# Patient Record
Sex: FEMALE | Race: WHITE | NOT HISPANIC OR LATINO | Employment: UNEMPLOYED | URBAN - METROPOLITAN AREA
[De-identification: names, ages, dates, MRNs, and addresses within clinical notes are randomized per-mention and may not be internally consistent; named-entity substitution may affect disease eponyms.]

---

## 2023-02-22 LAB
ALANINE AMINOTRANSFERASE (SGPT) (U/L) IN SER/PLAS: 17 U/L (ref 7–45)
ALBUMIN (G/DL) IN SER/PLAS: 4.6 G/DL (ref 3.4–5)
ALKALINE PHOSPHATASE (U/L) IN SER/PLAS: 51 U/L (ref 33–110)
ANION GAP IN SER/PLAS: 10 MMOL/L (ref 10–20)
ASPARTATE AMINOTRANSFERASE (SGOT) (U/L) IN SER/PLAS: 16 U/L (ref 9–39)
BASOPHILS (10*3/UL) IN BLOOD BY AUTOMATED COUNT: 0.03 X10E9/L (ref 0–0.1)
BASOPHILS/100 LEUKOCYTES IN BLOOD BY AUTOMATED COUNT: 0.6 % (ref 0–2)
BILIRUBIN TOTAL (MG/DL) IN SER/PLAS: 0.7 MG/DL (ref 0–1.2)
C REACTIVE PROTEIN (MG/L) IN SER/PLAS: 0.11 MG/DL
CALCIUM (MG/DL) IN SER/PLAS: 9.2 MG/DL (ref 8.6–10.3)
CARBON DIOXIDE, TOTAL (MMOL/L) IN SER/PLAS: 26 MMOL/L (ref 21–32)
CHLORIDE (MMOL/L) IN SER/PLAS: 106 MMOL/L (ref 98–107)
CHOLESTEROL (MG/DL) IN SER/PLAS: 176 MG/DL (ref 0–199)
CHOLESTEROL IN HDL (MG/DL) IN SER/PLAS: 58.1 MG/DL
CHOLESTEROL/HDL RATIO: 3
CITRULLINE ANTIBODY, IGG: <1 U/ML
CREATININE (MG/DL) IN SER/PLAS: 0.83 MG/DL (ref 0.5–1.05)
EOSINOPHILS (10*3/UL) IN BLOOD BY AUTOMATED COUNT: 0.64 X10E9/L (ref 0–0.7)
EOSINOPHILS/100 LEUKOCYTES IN BLOOD BY AUTOMATED COUNT: 13 % (ref 0–6)
ERYTHROCYTE DISTRIBUTION WIDTH (RATIO) BY AUTOMATED COUNT: 13.2 % (ref 11.5–14.5)
ERYTHROCYTE MEAN CORPUSCULAR HEMOGLOBIN CONCENTRATION (G/DL) BY AUTOMATED: 32.2 G/DL (ref 32–36)
ERYTHROCYTE MEAN CORPUSCULAR VOLUME (FL) BY AUTOMATED COUNT: 94 FL (ref 80–100)
ERYTHROCYTES (10*6/UL) IN BLOOD BY AUTOMATED COUNT: 4.7 X10E12/L (ref 4–5.2)
GFR FEMALE: >90 ML/MIN/1.73M2
GLUCOSE (MG/DL) IN SER/PLAS: 87 MG/DL (ref 74–99)
HEMATOCRIT (%) IN BLOOD BY AUTOMATED COUNT: 44.1 % (ref 36–46)
HEMOGLOBIN (G/DL) IN BLOOD: 14.2 G/DL (ref 12–16)
IMMATURE GRANULOCYTES/100 LEUKOCYTES IN BLOOD BY AUTOMATED COUNT: 0 % (ref 0–0.9)
LDL: 102 MG/DL (ref 0–99)
LEUKOCYTES (10*3/UL) IN BLOOD BY AUTOMATED COUNT: 4.9 X10E9/L (ref 4.4–11.3)
LYMPHOCYTES (10*3/UL) IN BLOOD BY AUTOMATED COUNT: 1.39 X10E9/L (ref 1.2–4.8)
LYMPHOCYTES/100 LEUKOCYTES IN BLOOD BY AUTOMATED COUNT: 28.3 % (ref 13–44)
MONOCYTES (10*3/UL) IN BLOOD BY AUTOMATED COUNT: 0.42 X10E9/L (ref 0.1–1)
MONOCYTES/100 LEUKOCYTES IN BLOOD BY AUTOMATED COUNT: 8.5 % (ref 2–10)
NEUTROPHILS (10*3/UL) IN BLOOD BY AUTOMATED COUNT: 2.44 X10E9/L (ref 1.2–7.7)
NEUTROPHILS/100 LEUKOCYTES IN BLOOD BY AUTOMATED COUNT: 49.6 % (ref 40–80)
PLATELETS (10*3/UL) IN BLOOD AUTOMATED COUNT: 205 X10E9/L (ref 150–450)
POTASSIUM (MMOL/L) IN SER/PLAS: 3.9 MMOL/L (ref 3.5–5.3)
PROTEIN TOTAL: 7.2 G/DL (ref 6.4–8.2)
SEDIMENTATION RATE, ERYTHROCYTE: 6 MM/H (ref 0–20)
SODIUM (MMOL/L) IN SER/PLAS: 138 MMOL/L (ref 136–145)
THYROTROPIN (MIU/L) IN SER/PLAS BY DETECTION LIMIT <= 0.05 MIU/L: 1.92 MIU/L (ref 0.44–3.98)
TRIGLYCERIDE (MG/DL) IN SER/PLAS: 82 MG/DL (ref 0–149)
UREA NITROGEN (MG/DL) IN SER/PLAS: 18 MG/DL (ref 6–23)
VLDL: 16 MG/DL (ref 0–40)

## 2023-02-23 LAB
ANTI-CENTROMERE: <0.2 AI
ANTI-CHROMATIN: <0.2 AI
ANTI-DNA (DS): <1 IU/ML
ANTI-JO-1 IGG: <0.2 AI
ANTI-NUCLEAR ANTIBODY (ANA): NEGATIVE
ANTI-RIBOSOMAL P: <0.2 AI
ANTI-RNP: <0.2 AI
ANTI-SCL-70: <0.2 AI
ANTI-SM/RNP: <0.2 AI
ANTI-SM: <0.2 AI
ANTI-SSA: <0.2 AI
ANTI-SSB: <0.2 AI

## 2023-05-09 DIAGNOSIS — K21.9 GASTROESOPHAGEAL REFLUX DISEASE, UNSPECIFIED WHETHER ESOPHAGITIS PRESENT: ICD-10-CM

## 2023-05-09 DIAGNOSIS — F90.9 ATTENTION DEFICIT HYPERACTIVITY DISORDER (ADHD), UNSPECIFIED ADHD TYPE: Primary | ICD-10-CM

## 2023-05-09 RX ORDER — DEXTROAMPHETAMINE SACCHARATE, AMPHETAMINE ASPARTATE, DEXTROAMPHETAMINE SULFATE AND AMPHETAMINE SULFATE 2.5; 2.5; 2.5; 2.5 MG/1; MG/1; MG/1; MG/1
10 TABLET ORAL ONCE AS NEEDED
Qty: 30 TABLET | Refills: 0 | Status: SHIPPED | OUTPATIENT
Start: 2023-05-09 | End: 2023-06-02 | Stop reason: SDUPTHER

## 2023-05-09 RX ORDER — OMEPRAZOLE 20 MG/1
20 CAPSULE, DELAYED RELEASE ORAL
Qty: 90 CAPSULE | Refills: 1 | Status: SHIPPED | OUTPATIENT
Start: 2023-05-09 | End: 2023-12-04 | Stop reason: SDUPTHER

## 2023-05-09 RX ORDER — DEXTROAMPHETAMINE SACCHARATE, AMPHETAMINE ASPARTATE MONOHYDRATE, DEXTROAMPHETAMINE SULFATE AND AMPHETAMINE SULFATE 5; 5; 5; 5 MG/1; MG/1; MG/1; MG/1
20 CAPSULE, EXTENDED RELEASE ORAL EVERY MORNING
Qty: 30 CAPSULE | Refills: 0 | Status: SHIPPED | OUTPATIENT
Start: 2023-05-09 | End: 2023-06-02 | Stop reason: SDUPTHER

## 2023-05-09 NOTE — PROGRESS NOTES
Patient requested refill on Prilosec and Adderall.  I have personally reviewed the OARRs Report. It is part of the electronic medical record. I have considered the risk, abuse, dependence, addiction and diversion. I believe that it is clinically appropriate to prescribe this medication.    Last refill: Adderall ER 10 mg #60 30 days 01/27/2023  Last office visit: 02/03/2023  Amphetamine - Dextroamphetamine XR  20 mg  in the am  Amphetamine- Dextroamphetamine 10 mg in the afternoon as needed.   UDS 12/27/2022  CSA  12/202022

## 2023-05-30 PROBLEM — C81.9A HODGKIN'S DISEASE IN REMISSION: Status: ACTIVE | Noted: 2023-05-30

## 2023-05-30 PROBLEM — H02.849 SWELLING OF EYELID: Status: ACTIVE | Noted: 2023-05-30

## 2023-05-30 PROBLEM — F90.2 ADHD (ATTENTION DEFICIT HYPERACTIVITY DISORDER), COMBINED TYPE: Status: ACTIVE | Noted: 2023-05-30

## 2023-05-30 PROBLEM — L50.3 DERMATOGRAPHIC URTICARIA: Status: ACTIVE | Noted: 2023-05-30

## 2023-05-30 PROBLEM — F17.200 SMOKING ADDICTION: Status: ACTIVE | Noted: 2023-05-30

## 2023-05-30 PROBLEM — K58.0 IRRITABLE BOWEL SYNDROME WITH DIARRHEA: Status: ACTIVE | Noted: 2023-05-30

## 2023-05-30 PROBLEM — J30.2 SEASONAL ALLERGIES: Status: ACTIVE | Noted: 2023-05-30

## 2023-05-30 PROBLEM — R25.1 TREMOR: Status: ACTIVE | Noted: 2023-05-30

## 2023-05-30 PROBLEM — C81.10: Status: ACTIVE | Noted: 2019-02-18

## 2023-05-30 PROBLEM — T45.1X5A PERIPHERAL NEUROPATHY DUE TO CHEMOTHERAPY (MULTI): Status: ACTIVE | Noted: 2023-05-30

## 2023-05-30 PROBLEM — R53.83 FATIGUE: Status: ACTIVE | Noted: 2023-05-30

## 2023-05-30 PROBLEM — B35.1 ONYCHOMYCOSIS OF RIGHT GREAT TOE: Status: ACTIVE | Noted: 2023-05-30

## 2023-05-30 PROBLEM — R19.7 DIARRHEA: Status: ACTIVE | Noted: 2023-05-30

## 2023-05-30 PROBLEM — K21.9 GERD (GASTROESOPHAGEAL REFLUX DISEASE): Status: ACTIVE | Noted: 2023-05-30

## 2023-05-30 PROBLEM — M89.8X1 PAIN OF RIGHT SCAPULA: Status: ACTIVE | Noted: 2023-05-30

## 2023-05-30 PROBLEM — R10.9 ABDOMINAL CRAMPING: Status: ACTIVE | Noted: 2023-05-30

## 2023-05-30 PROBLEM — R25.1 SHAKINESS: Status: ACTIVE | Noted: 2023-05-30

## 2023-05-30 PROBLEM — L50.3 DERMATOGRAPHISM: Status: ACTIVE | Noted: 2023-05-30

## 2023-05-30 PROBLEM — R45.0 JITTERY FEELING: Status: ACTIVE | Noted: 2023-05-30

## 2023-05-30 PROBLEM — G62.0 PERIPHERAL NEUROPATHY DUE TO CHEMOTHERAPY (MULTI): Status: ACTIVE | Noted: 2023-05-30

## 2023-05-30 PROBLEM — C81.90: Status: ACTIVE | Noted: 2023-05-30

## 2023-05-30 PROBLEM — R21 RASH: Status: ACTIVE | Noted: 2023-05-30

## 2023-05-30 PROBLEM — F17.200 NICOTINE DEPENDENCE: Status: ACTIVE | Noted: 2023-05-30

## 2023-05-30 PROBLEM — F41.9 ANXIETY: Status: ACTIVE | Noted: 2023-05-30

## 2023-05-30 PROBLEM — K22.10 ESOPHAGEAL ULCER: Status: ACTIVE | Noted: 2023-05-30

## 2023-05-30 PROBLEM — L50.8 PHYSICAL URTICARIA: Status: ACTIVE | Noted: 2023-05-30

## 2023-05-30 PROBLEM — R79.89 LOW VITAMIN D LEVEL: Status: ACTIVE | Noted: 2023-05-30

## 2023-05-30 PROBLEM — E55.9 VITAMIN D DEFICIENCY: Status: ACTIVE | Noted: 2023-05-30

## 2023-05-30 PROBLEM — R10.12 LEFT UPPER QUADRANT PAIN: Status: ACTIVE | Noted: 2023-05-30

## 2023-05-30 PROBLEM — I87.2 VENOUS INSUFFICIENCY: Status: ACTIVE | Noted: 2023-05-30

## 2023-05-30 PROBLEM — R22.1 NECK SWELLING: Status: ACTIVE | Noted: 2023-05-30

## 2023-05-30 PROBLEM — L29.9 SKIN PRURITUS: Status: ACTIVE | Noted: 2023-05-30

## 2023-05-30 PROBLEM — M54.2 NECK PAIN: Status: ACTIVE | Noted: 2023-05-30

## 2023-05-30 RX ORDER — ACETAMINOPHEN 500 MG
1 TABLET ORAL DAILY
COMMUNITY
End: 2023-06-02

## 2023-05-30 RX ORDER — BROMPHENIRAMINE MALEATE, PSEUDOEPHEDRINE HYDROCHLORIDE, AND DEXTROMETHORPHAN HYDROBROMIDE 2; 30; 10 MG/5ML; MG/5ML; MG/5ML
5 SYRUP ORAL EVERY 6 HOURS PRN
COMMUNITY
Start: 2023-03-30 | End: 2023-06-02

## 2023-05-30 RX ORDER — ESCITALOPRAM OXALATE 20 MG/1
TABLET ORAL EVERY 24 HOURS
COMMUNITY
End: 2023-06-02

## 2023-05-30 RX ORDER — HYDROXYZINE HYDROCHLORIDE 25 MG/1
TABLET, FILM COATED ORAL
COMMUNITY
Start: 2020-07-13 | End: 2023-06-02

## 2023-05-30 RX ORDER — TRIAMCINOLONE ACETONIDE 1 MG/G
CREAM TOPICAL 2 TIMES DAILY
COMMUNITY
Start: 2023-04-06 | End: 2023-06-02

## 2023-05-30 RX ORDER — MELOXICAM 7.5 MG/1
TABLET ORAL
COMMUNITY
Start: 2022-07-21 | End: 2023-06-02

## 2023-05-30 RX ORDER — MELOXICAM 15 MG/1
TABLET ORAL EVERY 24 HOURS
COMMUNITY
End: 2023-06-02

## 2023-05-30 RX ORDER — LORAZEPAM 1 MG/1
TABLET ORAL
COMMUNITY
Start: 2022-10-11 | End: 2023-06-02

## 2023-05-30 RX ORDER — LIDOCAINE HYDROCHLORIDE 20 MG/ML
15 SOLUTION OROPHARYNGEAL
COMMUNITY
Start: 2023-03-30 | End: 2023-06-02

## 2023-05-30 RX ORDER — RIFAXIMIN 550 MG/1
1 TABLET ORAL 3 TIMES DAILY
COMMUNITY
Start: 2022-12-15 | End: 2023-06-02

## 2023-05-30 RX ORDER — VENLAFAXINE HYDROCHLORIDE 37.5 MG/1
CAPSULE, EXTENDED RELEASE ORAL
COMMUNITY
Start: 2022-09-21 | End: 2023-06-02

## 2023-05-30 RX ORDER — GABAPENTIN 600 MG/1
600 TABLET ORAL 2 TIMES DAILY
COMMUNITY
Start: 2022-06-22 | End: 2023-06-02

## 2023-05-30 RX ORDER — LORATADINE 10 MG/1
1 TABLET ORAL NIGHTLY
COMMUNITY
End: 2023-06-02

## 2023-05-30 RX ORDER — GABAPENTIN 300 MG/1
300 CAPSULE ORAL 3 TIMES DAILY
COMMUNITY
Start: 2017-06-15 | End: 2023-06-02 | Stop reason: SDUPTHER

## 2023-05-30 RX ORDER — TERBINAFINE HYDROCHLORIDE 250 MG/1
TABLET ORAL
COMMUNITY
Start: 2021-03-30 | End: 2023-06-02

## 2023-05-30 RX ORDER — BUSPIRONE HYDROCHLORIDE 10 MG/1
10 TABLET ORAL 2 TIMES DAILY
COMMUNITY
Start: 2021-03-30 | End: 2023-06-02

## 2023-05-30 RX ORDER — FLUOXETINE HYDROCHLORIDE 40 MG/1
CAPSULE ORAL
COMMUNITY
Start: 2022-02-01 | End: 2023-06-02

## 2023-05-30 RX ORDER — ALBUTEROL SULFATE 90 UG/1
AEROSOL, METERED RESPIRATORY (INHALATION)
COMMUNITY
Start: 2022-07-26 | End: 2023-12-04 | Stop reason: ALTCHOICE

## 2023-05-30 RX ORDER — POLYETHYLENE GLYCOL 3350 17 G/17G
POWDER, FOR SOLUTION ORAL
COMMUNITY
Start: 2020-04-02 | End: 2023-06-02

## 2023-06-01 ENCOUNTER — APPOINTMENT (OUTPATIENT)
Dept: PRIMARY CARE | Facility: CLINIC | Age: 33
End: 2023-06-01
Payer: COMMERCIAL

## 2023-06-02 ENCOUNTER — OFFICE VISIT (OUTPATIENT)
Dept: PRIMARY CARE | Facility: CLINIC | Age: 33
End: 2023-06-02
Payer: COMMERCIAL

## 2023-06-02 VITALS
DIASTOLIC BLOOD PRESSURE: 82 MMHG | OXYGEN SATURATION: 98 % | SYSTOLIC BLOOD PRESSURE: 124 MMHG | HEIGHT: 70 IN | HEART RATE: 72 BPM | WEIGHT: 161 LBS | BODY MASS INDEX: 23.05 KG/M2

## 2023-06-02 DIAGNOSIS — L50.3 DERMATOGRAPHIC URTICARIA: ICD-10-CM

## 2023-06-02 DIAGNOSIS — F90.2 ADHD (ATTENTION DEFICIT HYPERACTIVITY DISORDER), COMBINED TYPE: Primary | ICD-10-CM

## 2023-06-02 DIAGNOSIS — J30.2 SEASONAL ALLERGIES: ICD-10-CM

## 2023-06-02 DIAGNOSIS — R29.898 HEAVY SENSATION OF LOWER EXTREMITY: ICD-10-CM

## 2023-06-02 DIAGNOSIS — T45.1X5A PERIPHERAL NEUROPATHY DUE TO CHEMOTHERAPY (MULTI): ICD-10-CM

## 2023-06-02 DIAGNOSIS — L29.9 SKIN PRURITUS: ICD-10-CM

## 2023-06-02 DIAGNOSIS — R21 RASH: ICD-10-CM

## 2023-06-02 DIAGNOSIS — I87.2 VENOUS INSUFFICIENCY: ICD-10-CM

## 2023-06-02 DIAGNOSIS — M79.89 SYMPTOM OF LEG SWELLING: ICD-10-CM

## 2023-06-02 DIAGNOSIS — F90.9 ATTENTION DEFICIT HYPERACTIVITY DISORDER (ADHD), UNSPECIFIED ADHD TYPE: ICD-10-CM

## 2023-06-02 DIAGNOSIS — G62.0 PERIPHERAL NEUROPATHY DUE TO CHEMOTHERAPY (MULTI): ICD-10-CM

## 2023-06-02 PROCEDURE — 99214 OFFICE O/P EST MOD 30 MIN: CPT | Performed by: NURSE PRACTITIONER

## 2023-06-02 RX ORDER — LEVOCETIRIZINE DIHYDROCHLORIDE 5 MG/1
TABLET, FILM COATED ORAL EVERY EVENING
COMMUNITY
End: 2023-06-02 | Stop reason: SDUPTHER

## 2023-06-02 RX ORDER — DEXTROAMPHETAMINE SACCHARATE, AMPHETAMINE ASPARTATE MONOHYDRATE, DEXTROAMPHETAMINE SULFATE AND AMPHETAMINE SULFATE 5; 5; 5; 5 MG/1; MG/1; MG/1; MG/1
20 CAPSULE, EXTENDED RELEASE ORAL EVERY MORNING
Qty: 30 CAPSULE | Refills: 0 | Status: SHIPPED | OUTPATIENT
Start: 2023-06-07 | End: 2023-08-02 | Stop reason: SDUPTHER

## 2023-06-02 RX ORDER — LEVOCETIRIZINE DIHYDROCHLORIDE 5 MG/1
5 TABLET, FILM COATED ORAL EVERY EVENING
Qty: 30 TABLET | Refills: 11 | Status: SHIPPED | OUTPATIENT
Start: 2023-06-02 | End: 2023-07-05

## 2023-06-02 RX ORDER — DEXTROAMPHETAMINE SACCHARATE, AMPHETAMINE ASPARTATE, DEXTROAMPHETAMINE SULFATE AND AMPHETAMINE SULFATE 2.5; 2.5; 2.5; 2.5 MG/1; MG/1; MG/1; MG/1
10 TABLET ORAL ONCE AS NEEDED
Qty: 30 TABLET | Refills: 0 | Status: SHIPPED | OUTPATIENT
Start: 2023-06-04 | End: 2023-08-02 | Stop reason: SDUPTHER

## 2023-06-02 RX ORDER — GABAPENTIN 300 MG/1
300 CAPSULE ORAL 3 TIMES DAILY
Qty: 90 CAPSULE | Refills: 5 | Status: SHIPPED | OUTPATIENT
Start: 2023-06-02 | End: 2023-12-04 | Stop reason: SDUPTHER

## 2023-06-02 ASSESSMENT — ENCOUNTER SYMPTOMS
NUMBNESS: 1
GASTROINTESTINAL NEGATIVE: 1
PSYCHIATRIC NEGATIVE: 1
CONSTITUTIONAL NEGATIVE: 1
ROS SKIN COMMENTS: AS NOTED IN HPI
CARDIOVASCULAR NEGATIVE: 1
RESPIRATORY NEGATIVE: 1

## 2023-06-02 ASSESSMENT — PAIN SCALES - GENERAL: PAINLEVEL: 0-NO PAIN

## 2023-06-02 NOTE — PROGRESS NOTES
"Subjective   Patient ID: Nelda Greenfield is a 33 y.o. female who presents for Follow-up (From [facility] ), Nail Problem, and Allergic Reaction (Bilateral arms x 1 year).    HPI   Patient here for follow up controlled medication. Last visit was virtual on 02/03/2023. She is accompanied by her .  Current concerns:   1) rash has returned that she has seen allergist for- although it is slightly different, she was told she had pressure-induced eczema  2) toe nail fungus has returned- although did not go away completely. Last treatment was 2 years ago with Terbinafine.   3) continues to have heavy feeling legs, swelling at times, wearing compression stockings. Wants referral to another vascular doctor for evaluation.   Chronic concerns: ADHD, Anxiety, IBS, GERD,   Peripheral neuropathy d/t chemotherapy, vitamin D deficiency, Seasonal Allergies, abdominal cramping, Pruritius  Specialist  - Gastroenterologist  - Allergist Dr Pham  - GYN- yearly  Labs 02/22/2023  SMOKER- 1/2 PPD    Review of Systems   Constitutional: Negative.    HENT:          Allergy symptoms- outside working in yard   Respiratory: Negative.     Cardiovascular: Negative.    Gastrointestinal: Negative.    Skin:         As noted in HPI   Neurological:  Positive for numbness (Peripheral neuropathy stable).   Psychiatric/Behavioral: Negative.           Objective   /82 (BP Location: Right arm, Patient Position: Sitting, BP Cuff Size: Adult)   Pulse 72   Ht 1.778 m (5' 10\")   Wt 73 kg (161 lb)   SpO2 98%   BMI 23.10 kg/m²   Stimulants:   What is the patient's goal of therapy? IMPROVE FOCUS  Is this being achieved with current treatment? YES    Activities of Daily Living:   Is your overall impression that this patient is benefiting (symptom reduction outweighs side effects) from stimulant therapy? Yes   Symptom Evaluation:  1. Physical functioning (fidgeting, physical impulse control, etc.) Better  2. Psychological functioning (daydreaming, " "staying on task, etc.) Better  3. Social Relationships Better  4. Family Relationships Better  5. Mood Better  6. Sleep Patterns Same  7. Overall Functioning Better    Follow up assessment(s) completed today:  No    What are the goal's of the patient's therapy? Improve focus   The goals of therapy are \"being met\" with the current medication regimen.    OARRS:  Shreya Quinones, APRN-CNP on 6/2/2023  9:51 AM  I have personally reviewed the OARRS report for Nelda Greenfield. I have considered the risks of abuse, dependence, addiction and diversion    Controlled Substance Agreement:  Date of the Last Agreement: 12/20/2022  Reviewed Controlled Substance Agreement including but not limited to the benefits, risks, and alternatives to treatment with a Controlled Substance medication(s).    Last Urine Drug Screen / ordered today: No  Recent Results (from the past 47726 hour(s))   OPIATE/OPIOID/BENZO PRESCRIPTION COMPLIANCE    Collection Time: 12/20/22  9:53 AM   Result Value Ref Range    DRUG SCREEN COMMENT URINE SEE BELOW     Creatine, Urine 122.2 mg/dL    Amphetamine Screen, Urine PRESUMPTIVE POSITIVE (A) NEGATIVE    Barbiturate Screen, Urine PRESUMPTIVE NEGATIVE NEGATIVE    Cannabinoid Screen, Urine PRESUMPTIVE NEGATIVE NEGATIVE    Cocaine Screen, Urine PRESUMPTIVE NEGATIVE NEGATIVE    PCP Screen, Urine PRESUMPTIVE NEGATIVE NEGATIVE    7-Aminoclonazepam <25 Cutoff <25 ng/mL    Alpha-Hydroxyalprazolam <25 Cutoff <25 ng/mL    Alpha-Hydroxymidazolam <25 Cutoff <25 ng/mL    Alprazolam <25 Cutoff <25 ng/mL    Chlordiazepoxide <25 Cutoff <25 ng/mL    Clonazepam <25 Cutoff <25 ng/mL    Diazepam <25 Cutoff <25 ng/mL    Lorazepam <25 Cutoff <25 ng/mL    Midazolam <25 Cutoff <25 ng/mL    Nordiazepam <25 Cutoff <25 ng/mL    Oxazepam <25 Cutoff <25 ng/mL    Temazepam <25 Cutoff <25 ng/mL    Zolpidem <25 Cutoff <25 ng/mL    Zolpidem Metabolite (ZCA) <25 Cutoff <25 ng/mL    6-Acetylmorphine <25 Cutoff <25 ng/mL    Codeine <50 Cutoff <50 " ng/mL    Hydrocodone <25 Cutoff <25 ng/mL    Hydromorphone <25 Cutoff <25 ng/mL    Morphine Urine <50 Cutoff <50 ng/mL    Norhydrocodone <25 Cutoff <25 ng/mL    Noroxycodone <25 Cutoff <25 ng/mL    Oxycodone <25 Cutoff <25 ng/mL    Oxymorphone <25 Cutoff <25 ng/mL    Tramadol <50 Cutoff <50 ng/mL    O-Desmethyltramadol <50 Cutoff <50 ng/mL    Fentanyl <2.5 Cutoff<2.5 ng/mL    Norfentanyl <2.5 Cutoff<2.5 ng/mL    METHADONE CONFIRMATION,URINE <25 Cutoff <25 ng/mL    EDDP <25 Cutoff <25 ng/mL   Amphetamine Confirm, Urine    Collection Time: 12/20/22  9:53 AM   Result Value Ref Range    Amphetamines,Urine 1,202 ng/mL    MDA, Urine <200 ng/mL    MDEA, Urine <200 ng/mL    MDMA, Urine <200 ng/mL    Methamphetamine Quant, Ur <200 ng/mL    Phentermine,Urine <200 ng/mL     Results are as expected.     Summary:  It is my overall clinical impression that this patient is benefiting from stimulant therapy.  It is my clinical opinion that this patient will benefit from continued treatment with this current medication regimen.  The patient will continue to be treated with stimulant medication.     I have personally reviewed the OARRs Report. It is part of the electronic medical record. I have considered the risk, abuse, dependence, addiction and diversion. I believe that it is clinically appropriate to prescribe this medication.    Last refill:   Amphetamine- Dextroamphetamine 10 mg 30 days 05/09/2023 -> 06/07/2023  Amphetamine- Dextroamphetamine XR 20 mg/ 24 hour 30 days 05/12/2023 -> 06/10/2023   UDS 12/7/22  CSA 12/20/22        Physical Exam  Vitals reviewed.   Constitutional:       Appearance: Normal appearance.   Cardiovascular:      Rate and Rhythm: Normal rate and regular rhythm.      Heart sounds: Normal heart sounds.   Pulmonary:      Breath sounds: Normal breath sounds.   Musculoskeletal:         General: Normal range of motion.      Right lower leg: No edema.      Left lower leg: No edema.   Skin:     Findings: Rash  (slight rash on lower right arm-faint) present.   Neurological:      General: No focal deficit present.      Mental Status: She is alert.   Psychiatric:         Mood and Affect: Mood normal.         Behavior: Behavior normal.         Judgment: Judgment normal.       Assessment/Plan   Diagnoses and all orders for this visit:  Health Maintenance  Labs - Labs 02/22/2023  Influenza- Unknown   Prevnar 13/20- Not indicated   Shingrix- Not indicated   Colonoscopy- 09/09/2022  Cervical Cancer screen -TYN  Mammogram- Not indicated   DEXA BONE Density - Not indicated.     Discussed nail fungus treatment previously was not effective, do not advise to use again. Referral to Podiatry recommended. - Plans to use over the counter treatments at this time.   ADHD (attention deficit hyperactivity disorder), combined type  -  Refilled  amphetamine-dextroamphetamine XR (Adderall XR) 20 mg 24 hr capsule; Take 1 capsule (20 mg) by mouth once daily in the morning. Do not crush or chew. Do not start before June 7, 2023.  -  Refilled  amphetamine-dextroamphetamine (Adderall) 10 mg tablet; Take 1 tablet (10 mg) by mouth 1 time if needed (in the afternoon .). Do not start before June 4, 2023.  Dermatographic urticaria / Rash / Skin pruritus /  Seasonal allergies-   -     Referral to Allergy; Future (Patient wants second opinion- recommended Dr. Cruz  -     levocetirizine (Xyzal) 5 mg tablet; Take 1 tablet (5 mg) by mouth once daily in the evening.  -     Referral to Vascular Medicine; Future  Heavy sensation of lower extremity   / Symptom of leg swelling  -     Referral to Vascular Medicine; Future - Recommended Dr. Laurie Srivastava- contact information provided- check insurance for network coverage!  Attention deficit hyperactivity disorder (ADHD), unspecified ADHD type  -     amphetamine-dextroamphetamine XR (Adderall XR) 20 mg 24 hr capsule; Take 1 capsule (20 mg) by mouth once daily in the morning. Do not crush or    chew. Do not start  before June 7, 2023.  -     amphetamine-dextroamphetamine (Adderall) 10 mg tablet; Take 1 tablet (10 mg) by mouth 1 time if needed (in the afternoon .). Do not start before June 4, 2023.  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     PLAN/FOLLOW UP 6 MONTHS

## 2023-08-02 DIAGNOSIS — F90.9 ATTENTION DEFICIT HYPERACTIVITY DISORDER (ADHD), UNSPECIFIED ADHD TYPE: ICD-10-CM

## 2023-08-02 DIAGNOSIS — F90.2 ADHD (ATTENTION DEFICIT HYPERACTIVITY DISORDER), COMBINED TYPE: ICD-10-CM

## 2023-08-02 RX ORDER — DEXTROAMPHETAMINE SACCHARATE, AMPHETAMINE ASPARTATE MONOHYDRATE, DEXTROAMPHETAMINE SULFATE AND AMPHETAMINE SULFATE 5; 5; 5; 5 MG/1; MG/1; MG/1; MG/1
20 CAPSULE, EXTENDED RELEASE ORAL EVERY MORNING
Qty: 30 CAPSULE | Refills: 0 | Status: SHIPPED | OUTPATIENT
Start: 2023-08-02 | End: 2023-09-14 | Stop reason: SDUPTHER

## 2023-08-02 RX ORDER — DEXTROAMPHETAMINE SACCHARATE, AMPHETAMINE ASPARTATE, DEXTROAMPHETAMINE SULFATE AND AMPHETAMINE SULFATE 2.5; 2.5; 2.5; 2.5 MG/1; MG/1; MG/1; MG/1
10 TABLET ORAL ONCE AS NEEDED
Qty: 30 TABLET | Refills: 0 | Status: SHIPPED | OUTPATIENT
Start: 2023-08-02 | End: 2023-09-14 | Stop reason: SDUPTHER

## 2023-09-14 DIAGNOSIS — F90.9 ATTENTION DEFICIT HYPERACTIVITY DISORDER (ADHD), UNSPECIFIED ADHD TYPE: ICD-10-CM

## 2023-09-14 DIAGNOSIS — J30.2 SEASONAL ALLERGIES: ICD-10-CM

## 2023-09-14 DIAGNOSIS — L29.9 SKIN PRURITUS: ICD-10-CM

## 2023-09-14 DIAGNOSIS — F90.2 ADHD (ATTENTION DEFICIT HYPERACTIVITY DISORDER), COMBINED TYPE: ICD-10-CM

## 2023-09-14 RX ORDER — LEVOCETIRIZINE DIHYDROCHLORIDE 5 MG/1
5 TABLET, FILM COATED ORAL EVERY EVENING
Qty: 30 TABLET | Refills: 11 | Status: SHIPPED | OUTPATIENT
Start: 2023-09-14 | End: 2024-06-04 | Stop reason: SDUPTHER

## 2023-09-14 RX ORDER — DEXTROAMPHETAMINE SACCHARATE, AMPHETAMINE ASPARTATE MONOHYDRATE, DEXTROAMPHETAMINE SULFATE AND AMPHETAMINE SULFATE 5; 5; 5; 5 MG/1; MG/1; MG/1; MG/1
20 CAPSULE, EXTENDED RELEASE ORAL EVERY MORNING
Qty: 30 CAPSULE | Refills: 0 | Status: SHIPPED | OUTPATIENT
Start: 2023-09-14 | End: 2023-10-17 | Stop reason: SDUPTHER

## 2023-09-14 RX ORDER — DEXTROAMPHETAMINE SACCHARATE, AMPHETAMINE ASPARTATE, DEXTROAMPHETAMINE SULFATE AND AMPHETAMINE SULFATE 2.5; 2.5; 2.5; 2.5 MG/1; MG/1; MG/1; MG/1
10 TABLET ORAL ONCE AS NEEDED
Qty: 30 TABLET | Refills: 0 | Status: SHIPPED | OUTPATIENT
Start: 2023-09-14 | End: 2023-10-17 | Stop reason: SDUPTHER

## 2023-10-17 DIAGNOSIS — F90.2 ADHD (ATTENTION DEFICIT HYPERACTIVITY DISORDER), COMBINED TYPE: ICD-10-CM

## 2023-10-17 DIAGNOSIS — F90.9 ATTENTION DEFICIT HYPERACTIVITY DISORDER (ADHD), UNSPECIFIED ADHD TYPE: ICD-10-CM

## 2023-10-17 RX ORDER — DEXTROAMPHETAMINE SACCHARATE, AMPHETAMINE ASPARTATE, DEXTROAMPHETAMINE SULFATE AND AMPHETAMINE SULFATE 2.5; 2.5; 2.5; 2.5 MG/1; MG/1; MG/1; MG/1
10 TABLET ORAL ONCE AS NEEDED
Qty: 30 TABLET | Refills: 0 | Status: SHIPPED | OUTPATIENT
Start: 2023-10-17 | End: 2023-12-04 | Stop reason: SDUPTHER

## 2023-10-17 RX ORDER — DEXTROAMPHETAMINE SACCHARATE, AMPHETAMINE ASPARTATE MONOHYDRATE, DEXTROAMPHETAMINE SULFATE AND AMPHETAMINE SULFATE 5; 5; 5; 5 MG/1; MG/1; MG/1; MG/1
20 CAPSULE, EXTENDED RELEASE ORAL EVERY MORNING
Qty: 30 CAPSULE | Refills: 0 | Status: SHIPPED | OUTPATIENT
Start: 2023-10-17 | End: 2023-12-04 | Stop reason: SDUPTHER

## 2023-12-04 ENCOUNTER — OFFICE VISIT (OUTPATIENT)
Dept: PRIMARY CARE | Facility: CLINIC | Age: 33
End: 2023-12-04
Payer: COMMERCIAL

## 2023-12-04 VITALS
WEIGHT: 163 LBS | DIASTOLIC BLOOD PRESSURE: 72 MMHG | TEMPERATURE: 97.3 F | HEIGHT: 70 IN | OXYGEN SATURATION: 97 % | BODY MASS INDEX: 23.34 KG/M2 | HEART RATE: 86 BPM | SYSTOLIC BLOOD PRESSURE: 122 MMHG

## 2023-12-04 DIAGNOSIS — F40.243 FEAR OF FLYING: Primary | ICD-10-CM

## 2023-12-04 DIAGNOSIS — Z79.899 HIGH RISK MEDICATION USE: ICD-10-CM

## 2023-12-04 DIAGNOSIS — G62.0 PERIPHERAL NEUROPATHY DUE TO CHEMOTHERAPY (MULTI): ICD-10-CM

## 2023-12-04 DIAGNOSIS — K21.9 GASTROESOPHAGEAL REFLUX DISEASE, UNSPECIFIED WHETHER ESOPHAGITIS PRESENT: ICD-10-CM

## 2023-12-04 DIAGNOSIS — F90.2 ADHD (ATTENTION DEFICIT HYPERACTIVITY DISORDER), COMBINED TYPE: Primary | ICD-10-CM

## 2023-12-04 DIAGNOSIS — Z13.29 SCREENING FOR THYROID DISORDER: ICD-10-CM

## 2023-12-04 DIAGNOSIS — B35.1 ONYCHOMYCOSIS OF RIGHT GREAT TOE: ICD-10-CM

## 2023-12-04 DIAGNOSIS — T45.1X5A PERIPHERAL NEUROPATHY DUE TO CHEMOTHERAPY (MULTI): ICD-10-CM

## 2023-12-04 DIAGNOSIS — Z13.220 SCREENING, LIPID: ICD-10-CM

## 2023-12-04 DIAGNOSIS — F90.9 ATTENTION DEFICIT HYPERACTIVITY DISORDER (ADHD), UNSPECIFIED ADHD TYPE: ICD-10-CM

## 2023-12-04 PROCEDURE — 99214 OFFICE O/P EST MOD 30 MIN: CPT | Performed by: NURSE PRACTITIONER

## 2023-12-04 RX ORDER — GABAPENTIN 300 MG/1
300 CAPSULE ORAL 3 TIMES DAILY
Qty: 90 CAPSULE | Refills: 5 | Status: SHIPPED | OUTPATIENT
Start: 2023-12-04 | End: 2024-06-04 | Stop reason: SDUPTHER

## 2023-12-04 RX ORDER — DEXTROAMPHETAMINE SACCHARATE, AMPHETAMINE ASPARTATE, DEXTROAMPHETAMINE SULFATE AND AMPHETAMINE SULFATE 2.5; 2.5; 2.5; 2.5 MG/1; MG/1; MG/1; MG/1
10 TABLET ORAL ONCE AS NEEDED
Qty: 30 TABLET | Refills: 0 | Status: SHIPPED | OUTPATIENT
Start: 2023-12-04 | End: 2024-01-17 | Stop reason: SDUPTHER

## 2023-12-04 RX ORDER — LORAZEPAM 0.5 MG/1
0.5 TABLET ORAL DAILY PRN
Qty: 2 TABLET | Refills: 0 | Status: SHIPPED | OUTPATIENT
Start: 2023-12-04

## 2023-12-04 RX ORDER — DEXTROAMPHETAMINE SACCHARATE, AMPHETAMINE ASPARTATE MONOHYDRATE, DEXTROAMPHETAMINE SULFATE AND AMPHETAMINE SULFATE 5; 5; 5; 5 MG/1; MG/1; MG/1; MG/1
20 CAPSULE, EXTENDED RELEASE ORAL EVERY MORNING
Qty: 30 CAPSULE | Refills: 0 | Status: SHIPPED | OUTPATIENT
Start: 2023-12-04 | End: 2024-01-17 | Stop reason: SDUPTHER

## 2023-12-04 RX ORDER — OMEPRAZOLE 20 MG/1
20 CAPSULE, DELAYED RELEASE ORAL
Qty: 90 CAPSULE | Refills: 1 | Status: SHIPPED | OUTPATIENT
Start: 2023-12-04 | End: 2024-06-04 | Stop reason: SDUPTHER

## 2023-12-04 ASSESSMENT — ENCOUNTER SYMPTOMS
RESPIRATORY NEGATIVE: 1
NEUROLOGICAL NEGATIVE: 1
CONSTITUTIONAL NEGATIVE: 1
CARDIOVASCULAR NEGATIVE: 1
GASTROINTESTINAL NEGATIVE: 1

## 2023-12-04 NOTE — PATIENT INSTRUCTIONS
Urine drug screen ordered, please complete this month  Biotin for thinning hair, talk to pharmacist regarding Adderall and hair loss.  Lab orders to be completed for next appointment review. Labs are fasting 10-12 hours do not eat, please drink adequate water prior to lab draw.

## 2023-12-04 NOTE — PROGRESS NOTES
"Subjective   Patient ID: Nelda Greenfield is a 33 y.o. female who presents for Follow-up.    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    Respiratory: Negative.     Cardiovascular: Negative.    Gastrointestinal: Negative.    Skin:         Intermittent rash.  Did not see dermatology    Neurological: Negative.        Objective   /72   Pulse 86   Temp 36.3 °C (97.3 °F)   Ht 1.778 m (5' 10\")   Wt 73.9 kg (163 lb)   SpO2 97%   BMI 23.39 kg/m²   Weight stable.     Stimulants:   What is the patient's goal of therapy? IMPROVE FOCUS  Is this being achieved with current treatment? YES    Activities of Daily Living:   Is your overall impression that this patient is benefiting (symptom reduction outweighs side effects) from stimulant therapy? Yes   1. Physical Functioning: Better  2. Family Relationship: Better  3. Social Relationship: Better  4. Mood: Better  5. Sleep Patterns: Same  6. Overall Function: Better    I have personally reviewed the OARRs Report. It is part of the electronic medical record. I have considered the risk, abuse, dependence, addiction and diversion. I believe that it is clinically appropriate to prescribe this medication.    Last refill: Adderall ER 20 mg 30 days 10/27/2023 -> 11/25/2023  Adderall 10 mg IR 30 days 10/27/2023 -> 11/25/2023  UDS 12/04/2023- ordered   CSA  12/04/2023   Physical Exam    Assessment/Plan   Labs - Labs 02/22/2023  Influenza- declined   Prevnar 13/20- Not indicated   Shingrix- Not indicated   Colonoscopy- 09/09/2022  Cervical Cancer " screen -TYN  Mammogram- Not indicated   DEXA BONE Density - Not indicated.   Diagnoses and all orders for this visit:  ADHD (attention deficit hyperactivity disorder), combined type  -     Amphetamine Confirm, Urine; Future  -     Opiate/Opioid/Benzo Prescription Compliance; Future  -  Refilled amphetamine-dextroamphetamine (Adderall) 10 mg tablet; Take 1 tablet (10 mg) by mouth 1 time if needed (in the afternoon .).  -  Refilled amphetamine-dextroamphetamine XR (Adderall XR) 20 mg 24 hr capsule; Take 1 capsule (20 mg) by mouth once daily in the morning. Do not crush or chew.  High risk medication use  -     Amphetamine Confirm, Urine; Future  -     Opiate/Opioid/Benzo Prescription Compliance; Future  Peripheral neuropathy due to chemotherapy (CMS/HCC)  -     CBC and Auto Differential; Future  -     Comprehensive Metabolic Panel; Future  -  Refilled  gabapentin (Neurontin) 300 mg capsule; Take 1 capsule (300 mg) by mouth 3 times a day.  Screening for thyroid disorder  -     TSH with reflex to Free T4 if abnormal; Future  Screening, lipid  -     Lipid Panel; Future  Onychomycosis of right great toe  -     Referral to Podiatry; Future  Gastroesophageal reflux disease, unspecified whether esophagitis present  - Refilled omeprazole (PriLOSEC) 20 mg DR capsule; Take 1 capsule (20 mg) by mouth once daily in the morning. Take before meals. Do not crush or chew.     PLAN: follow up 6 months for control refills  Labs complete for review at next appt  UDS ordered, instructed to complete this month.

## 2024-01-03 ENCOUNTER — LAB (OUTPATIENT)
Dept: LAB | Facility: LAB | Age: 34
End: 2024-01-03
Payer: COMMERCIAL

## 2024-01-03 DIAGNOSIS — Z79.899 HIGH RISK MEDICATION USE: ICD-10-CM

## 2024-01-03 DIAGNOSIS — F90.2 ADHD (ATTENTION DEFICIT HYPERACTIVITY DISORDER), COMBINED TYPE: ICD-10-CM

## 2024-01-03 PROCEDURE — 80368 SEDATIVE HYPNOTICS: CPT

## 2024-01-03 PROCEDURE — 80358 DRUG SCREENING METHADONE: CPT

## 2024-01-03 PROCEDURE — 80324 DRUG SCREEN AMPHETAMINES 1/2: CPT

## 2024-01-03 PROCEDURE — 80373 DRUG SCREENING TRAMADOL: CPT

## 2024-01-03 PROCEDURE — 82570 ASSAY OF URINE CREATININE: CPT

## 2024-01-03 PROCEDURE — 80307 DRUG TEST PRSMV CHEM ANLYZR: CPT

## 2024-01-03 PROCEDURE — 80346 BENZODIAZEPINES1-12: CPT

## 2024-01-03 PROCEDURE — 80361 OPIATES 1 OR MORE: CPT

## 2024-01-03 PROCEDURE — 80354 DRUG SCREENING FENTANYL: CPT

## 2024-01-03 PROCEDURE — 80365 DRUG SCREENING OXYCODONE: CPT

## 2024-01-04 LAB
AMPHETAMINES UR QL SCN: ABNORMAL
BARBITURATES UR QL SCN: ABNORMAL
BZE UR QL SCN: ABNORMAL
CANNABINOIDS UR QL SCN: ABNORMAL
CREAT UR-MCNC: 115 MG/DL (ref 20–320)
PCP UR QL SCN: ABNORMAL

## 2024-01-10 LAB
AMPHET UR-MCNC: 1274 NG/ML
MDA UR-MCNC: <200 NG/ML
MDEA UR-MCNC: <200 NG/ML
MDMA UR-MCNC: <200 NG/ML
METHAMPHET UR-MCNC: <200 NG/ML
PHENTERMINE UR CFM-MCNC: <200 NG/ML

## 2024-01-17 DIAGNOSIS — F90.9 ATTENTION DEFICIT HYPERACTIVITY DISORDER (ADHD), UNSPECIFIED ADHD TYPE: ICD-10-CM

## 2024-01-17 DIAGNOSIS — F90.2 ADHD (ATTENTION DEFICIT HYPERACTIVITY DISORDER), COMBINED TYPE: ICD-10-CM

## 2024-01-17 RX ORDER — DEXTROAMPHETAMINE SACCHARATE, AMPHETAMINE ASPARTATE, DEXTROAMPHETAMINE SULFATE AND AMPHETAMINE SULFATE 2.5; 2.5; 2.5; 2.5 MG/1; MG/1; MG/1; MG/1
10 TABLET ORAL ONCE AS NEEDED
Qty: 30 TABLET | Refills: 0 | Status: SHIPPED | OUTPATIENT
Start: 2024-01-17 | End: 2024-03-06 | Stop reason: SDUPTHER

## 2024-01-17 RX ORDER — DEXTROAMPHETAMINE SACCHARATE, AMPHETAMINE ASPARTATE MONOHYDRATE, DEXTROAMPHETAMINE SULFATE AND AMPHETAMINE SULFATE 5; 5; 5; 5 MG/1; MG/1; MG/1; MG/1
20 CAPSULE, EXTENDED RELEASE ORAL EVERY MORNING
Qty: 30 CAPSULE | Refills: 0 | Status: SHIPPED | OUTPATIENT
Start: 2024-01-17 | End: 2024-03-06 | Stop reason: SDUPTHER

## 2024-03-06 DIAGNOSIS — F90.2 ADHD (ATTENTION DEFICIT HYPERACTIVITY DISORDER), COMBINED TYPE: ICD-10-CM

## 2024-03-06 DIAGNOSIS — F90.9 ATTENTION DEFICIT HYPERACTIVITY DISORDER (ADHD), UNSPECIFIED ADHD TYPE: ICD-10-CM

## 2024-03-06 RX ORDER — DEXTROAMPHETAMINE SACCHARATE, AMPHETAMINE ASPARTATE, DEXTROAMPHETAMINE SULFATE AND AMPHETAMINE SULFATE 2.5; 2.5; 2.5; 2.5 MG/1; MG/1; MG/1; MG/1
10 TABLET ORAL ONCE AS NEEDED
Qty: 30 TABLET | Refills: 0 | Status: SHIPPED | OUTPATIENT
Start: 2024-03-06 | End: 2024-04-15 | Stop reason: SDUPTHER

## 2024-03-06 RX ORDER — DEXTROAMPHETAMINE SACCHARATE, AMPHETAMINE ASPARTATE MONOHYDRATE, DEXTROAMPHETAMINE SULFATE AND AMPHETAMINE SULFATE 5; 5; 5; 5 MG/1; MG/1; MG/1; MG/1
20 CAPSULE, EXTENDED RELEASE ORAL EVERY MORNING
Qty: 30 CAPSULE | Refills: 0 | Status: SHIPPED | OUTPATIENT
Start: 2024-03-06 | End: 2024-04-15 | Stop reason: SDUPTHER

## 2024-04-15 DIAGNOSIS — F90.9 ATTENTION DEFICIT HYPERACTIVITY DISORDER (ADHD), UNSPECIFIED ADHD TYPE: ICD-10-CM

## 2024-04-15 DIAGNOSIS — F90.2 ADHD (ATTENTION DEFICIT HYPERACTIVITY DISORDER), COMBINED TYPE: ICD-10-CM

## 2024-04-15 RX ORDER — DEXTROAMPHETAMINE SACCHARATE, AMPHETAMINE ASPARTATE MONOHYDRATE, DEXTROAMPHETAMINE SULFATE AND AMPHETAMINE SULFATE 5; 5; 5; 5 MG/1; MG/1; MG/1; MG/1
20 CAPSULE, EXTENDED RELEASE ORAL EVERY MORNING
Qty: 30 CAPSULE | Refills: 0 | Status: SHIPPED | OUTPATIENT
Start: 2024-04-15 | End: 2024-06-04 | Stop reason: SINTOL

## 2024-04-15 RX ORDER — DEXTROAMPHETAMINE SACCHARATE, AMPHETAMINE ASPARTATE, DEXTROAMPHETAMINE SULFATE AND AMPHETAMINE SULFATE 2.5; 2.5; 2.5; 2.5 MG/1; MG/1; MG/1; MG/1
10 TABLET ORAL ONCE AS NEEDED
Qty: 30 TABLET | Refills: 0 | Status: SHIPPED | OUTPATIENT
Start: 2024-04-15 | End: 2024-06-04 | Stop reason: SINTOL

## 2024-06-03 DIAGNOSIS — K21.9 GASTROESOPHAGEAL REFLUX DISEASE, UNSPECIFIED WHETHER ESOPHAGITIS PRESENT: ICD-10-CM

## 2024-06-03 RX ORDER — OMEPRAZOLE 20 MG/1
20 CAPSULE, DELAYED RELEASE ORAL
Qty: 30 CAPSULE | Refills: 5 | OUTPATIENT
Start: 2024-06-03

## 2024-06-04 ENCOUNTER — OFFICE VISIT (OUTPATIENT)
Dept: PRIMARY CARE | Facility: CLINIC | Age: 34
End: 2024-06-04
Payer: COMMERCIAL

## 2024-06-04 VITALS
HEIGHT: 70 IN | DIASTOLIC BLOOD PRESSURE: 80 MMHG | BODY MASS INDEX: 24.28 KG/M2 | OXYGEN SATURATION: 99 % | WEIGHT: 169.6 LBS | HEART RATE: 77 BPM | SYSTOLIC BLOOD PRESSURE: 120 MMHG

## 2024-06-04 DIAGNOSIS — G62.0 PERIPHERAL NEUROPATHY DUE TO CHEMOTHERAPY (MULTI): ICD-10-CM

## 2024-06-04 DIAGNOSIS — K21.9 GASTROESOPHAGEAL REFLUX DISEASE, UNSPECIFIED WHETHER ESOPHAGITIS PRESENT: ICD-10-CM

## 2024-06-04 DIAGNOSIS — J30.2 SEASONAL ALLERGIES: ICD-10-CM

## 2024-06-04 DIAGNOSIS — L29.9 SKIN PRURITUS: ICD-10-CM

## 2024-06-04 DIAGNOSIS — F90.2 ADHD (ATTENTION DEFICIT HYPERACTIVITY DISORDER), COMBINED TYPE: Primary | ICD-10-CM

## 2024-06-04 DIAGNOSIS — T45.1X5A PERIPHERAL NEUROPATHY DUE TO CHEMOTHERAPY (MULTI): ICD-10-CM

## 2024-06-04 PROCEDURE — 99214 OFFICE O/P EST MOD 30 MIN: CPT | Performed by: NURSE PRACTITIONER

## 2024-06-04 RX ORDER — GABAPENTIN 300 MG/1
300 CAPSULE ORAL 3 TIMES DAILY
Qty: 90 CAPSULE | Refills: 5 | Status: SHIPPED | OUTPATIENT
Start: 2024-06-04

## 2024-06-04 RX ORDER — LEVOCETIRIZINE DIHYDROCHLORIDE 5 MG/1
5 TABLET, FILM COATED ORAL EVERY EVENING
Qty: 30 TABLET | Refills: 11 | Status: SHIPPED | OUTPATIENT
Start: 2024-06-04

## 2024-06-04 RX ORDER — LISDEXAMFETAMINE DIMESYLATE 30 MG/1
30 CAPSULE ORAL EVERY MORNING
Qty: 30 CAPSULE | Refills: 0 | Status: SHIPPED | OUTPATIENT
Start: 2024-06-04 | End: 2024-07-04

## 2024-06-04 RX ORDER — OMEPRAZOLE 20 MG/1
20 CAPSULE, DELAYED RELEASE ORAL
Qty: 90 CAPSULE | Refills: 3 | Status: SHIPPED | OUTPATIENT
Start: 2024-06-04

## 2024-06-04 ASSESSMENT — ENCOUNTER SYMPTOMS
CHEST TIGHTNESS: 0
NERVOUS/ANXIOUS: 1
SHORTNESS OF BREATH: 0
CARDIOVASCULAR NEGATIVE: 1
CONSTITUTIONAL NEGATIVE: 1
DECREASED CONCENTRATION: 1
SLEEP DISTURBANCE: 0
HEADACHES: 0
GASTROINTESTINAL NEGATIVE: 1

## 2024-06-04 NOTE — PROGRESS NOTES
"Subjective   Patient ID: Nelda Greenfield is a 34 y.o. female who presents for Follow-up (6m fu /Lov 11/3/24/Per pt she feels like her adderall haincreasing her anxiety and she was hoping to discuss that today. ).    HPI   Patient here for follow up. Last office visit on 12/04/2023  Current concern  1) Adderall possibly increasing anxiety.   When not taking adderall, lack of focus and less anxiety, When taking Addeall her anxiety levels increase and has physical side effects- uncomfortable.  Reports no change in stress level.  Had tried Vyvanse previously but was also drinking energy drinks at the time so not sure if any concerning side effects, she stopped energy drinks  d/t ulcers.  Took 10 mg dose today and anxiety increased today.   Chronic concerns: ADHD, Anxiety, IBS, GERD,  Peripheral neuropathy d/t chemotherapy, vitamin D deficiency, Seasonal Allergies, abdominal cramping, Prur[facility]  Specialist  - Gastroenterologist  - Allergist Dr Pham  - GYN- yearly  Labs 02/22/2023  SMOKER- 1/2 PPD    Review of Systems   Constitutional: Negative.    Respiratory:  Negative for chest tightness (tight muscles) and shortness of breath (with increased anxiety).    Cardiovascular: Negative.    Gastrointestinal: Negative.    Neurological:  Negative for headaches.        Tingling nerve sensation with increased anxiety.   Psychiatric/Behavioral:  Positive for decreased concentration. Negative for sleep disturbance. The patient is nervous/anxious.        Objective   /80 (BP Location: Right arm, Patient Position: Sitting, BP Cuff Size: Adult)   Pulse 77   Ht 1.778 m (5' 10\")   Wt 76.9 kg (169 lb 9.6 oz)   SpO2 99%   BMI 24.34 kg/m²   Weight 163 lbs     Stimulants:   What is the patient's goal of therapy?   Is this being achieved with current treatment? IMPROVE FOCUS    Activities of Daily Living:   Is your overall impression that this patient is benefiting (symptom reduction outweighs side effects) from stimulant " therapy? No     1. Physical Functioning: Worse  2. Family Relationship: Worse  3. Social Relationship: Worse  4. Mood: Worse  5. Sleep Patterns: Same  6. Overall Function: Worse    I have personally reviewed the OARRs Report. It is part of the electronic medical record. I have considered the risk, abuse, dependence, addiction and diversion. I believe that it is clinically appropriate to prescribe this medication.    Last refill:  - Adderall 10 mg  30 days 04/15/2024 ->  05/14/2024  - Adderall ER 20 mg 30 days 04/15/2024-> 05/14/2024.   UDS 01/04/2024  CSA  12/04/2023        Physical Exam  Vitals reviewed.   Constitutional:       Appearance: She is normal weight.   HENT:      Right Ear: Tympanic membrane and ear canal normal.      Left Ear: Tympanic membrane and ear canal normal.      Mouth/Throat:      Mouth: Mucous membranes are moist.   Cardiovascular:      Rate and Rhythm: Normal rate and regular rhythm.      Heart sounds: Normal heart sounds.   Pulmonary:      Effort: Pulmonary effort is normal.      Breath sounds: Normal breath sounds.   Musculoskeletal:         General: Normal range of motion.   Skin:     General: Skin is warm.   Neurological:      General: No focal deficit present.      Mental Status: She is alert.   Psychiatric:         Mood and Affect: Mood normal.         Behavior: Behavior normal.         Judgment: Judgment normal.         Assessment/Plan   Labs - 02/22/2023- DID NOT COMPLETE LABS FOR THIS VISIT   Influenza- declined   Prevnar 13/20- Not indicated   Shingrix- Not indicated   Colonoscopy- 09/09/2022  Cervical Cancer screen -TYN  Mammogram- Not indicated   DEXA BONE Density - Not indicated.     Diagnoses and all orders for this visit:  ADHD (attention deficit hyperactivity disorder), combined type  Discontinue Adderall 10 mg in afternoon and Adderall XR 20 mg in the am.   -  Initiated lisdexamfetamine (Vyvanse) 30 mg capsule; Take 1 capsule (30 mg) by mouth once daily in the  morning.  Gastroesophageal reflux disease, unspecified whether esophagitis present  - Refilled omeprazole (PriLOSEC) 20 mg DR capsule; Take 1 capsule (20 mg) by mouth once daily in the morning. Take before meals. Do not crush or chew.  Peripheral neuropathy due to chemotherapy (Multi)  - Refilled  gabapentin (Neurontin) 300 mg capsule; Take 1 capsule (300 mg) by mouth 3 times a day.  Skin pruritus  / Seasonal allergies    PLAN: follow up one month  Vyvanse effectiveness?  Lab orders- review results at follow up.   - Refilled  levocetirizine (Xyzal) 5 mg tablet; Take 1 tablet (5 mg) by mouth once daily in the evening.

## 2024-07-02 ENCOUNTER — APPOINTMENT (OUTPATIENT)
Dept: PRIMARY CARE | Facility: CLINIC | Age: 34
End: 2024-07-02
Payer: COMMERCIAL

## 2024-07-09 DIAGNOSIS — F90.2 ADHD (ATTENTION DEFICIT HYPERACTIVITY DISORDER), COMBINED TYPE: ICD-10-CM

## 2024-07-09 RX ORDER — LISDEXAMFETAMINE DIMESYLATE 30 MG/1
30 CAPSULE ORAL EVERY MORNING
Qty: 30 CAPSULE | Refills: 0 | Status: SHIPPED | OUTPATIENT
Start: 2024-07-09 | End: 2024-08-08

## 2024-09-30 ENCOUNTER — APPOINTMENT (OUTPATIENT)
Dept: RADIOLOGY | Facility: HOSPITAL | Age: 34
End: 2024-09-30
Payer: COMMERCIAL

## 2024-09-30 ENCOUNTER — OFFICE VISIT (OUTPATIENT)
Dept: URGENT CARE | Age: 34
End: 2024-09-30
Payer: COMMERCIAL

## 2024-09-30 ENCOUNTER — APPOINTMENT (OUTPATIENT)
Dept: CARDIOLOGY | Facility: HOSPITAL | Age: 34
End: 2024-09-30
Payer: COMMERCIAL

## 2024-09-30 ENCOUNTER — HOSPITAL ENCOUNTER (EMERGENCY)
Facility: HOSPITAL | Age: 34
Discharge: HOME | End: 2024-09-30
Attending: EMERGENCY MEDICINE
Payer: COMMERCIAL

## 2024-09-30 VITALS
TEMPERATURE: 98.1 F | SYSTOLIC BLOOD PRESSURE: 128 MMHG | HEART RATE: 62 BPM | WEIGHT: 165 LBS | HEIGHT: 70 IN | BODY MASS INDEX: 23.62 KG/M2 | RESPIRATION RATE: 16 BRPM | DIASTOLIC BLOOD PRESSURE: 88 MMHG | OXYGEN SATURATION: 99 %

## 2024-09-30 VITALS
RESPIRATION RATE: 16 BRPM | OXYGEN SATURATION: 100 % | SYSTOLIC BLOOD PRESSURE: 144 MMHG | DIASTOLIC BLOOD PRESSURE: 81 MMHG | TEMPERATURE: 97 F | HEART RATE: 64 BPM

## 2024-09-30 DIAGNOSIS — R00.2 PALPITATIONS: ICD-10-CM

## 2024-09-30 DIAGNOSIS — R07.9 CHEST PAIN, UNSPECIFIED TYPE: Primary | ICD-10-CM

## 2024-09-30 LAB
ALBUMIN SERPL BCP-MCNC: 5.1 G/DL (ref 3.4–5)
ALP SERPL-CCNC: 65 U/L (ref 33–110)
ALT SERPL W P-5'-P-CCNC: 15 U/L (ref 7–45)
ANION GAP SERPL CALC-SCNC: 14 MMOL/L (ref 10–20)
APPEARANCE UR: CLEAR
AST SERPL W P-5'-P-CCNC: 15 U/L (ref 9–39)
BACTERIA #/AREA URNS AUTO: ABNORMAL /HPF
BASOPHILS # BLD AUTO: 0.06 X10*3/UL (ref 0–0.1)
BASOPHILS NFR BLD AUTO: 0.8 %
BILIRUB SERPL-MCNC: 0.9 MG/DL (ref 0–1.2)
BILIRUB UR STRIP.AUTO-MCNC: NEGATIVE MG/DL
BNP SERPL-MCNC: 27 PG/ML (ref 0–99)
BUN SERPL-MCNC: 15 MG/DL (ref 6–23)
CALCIUM SERPL-MCNC: 9.6 MG/DL (ref 8.6–10.3)
CARDIAC TROPONIN I PNL SERPL HS: 3 NG/L (ref 0–13)
CARDIAC TROPONIN I PNL SERPL HS: <3 NG/L (ref 0–13)
CHLORIDE SERPL-SCNC: 102 MMOL/L (ref 98–107)
CO2 SERPL-SCNC: 25 MMOL/L (ref 21–32)
COLOR UR: YELLOW
CREAT SERPL-MCNC: 0.88 MG/DL (ref 0.5–1.05)
D DIMER PPP FEU-MCNC: 360 NG/ML FEU
EGFRCR SERPLBLD CKD-EPI 2021: 87 ML/MIN/1.73M*2
EOSINOPHIL # BLD AUTO: 0.67 X10*3/UL (ref 0–0.7)
EOSINOPHIL NFR BLD AUTO: 8.8 %
ERYTHROCYTE [DISTWIDTH] IN BLOOD BY AUTOMATED COUNT: 14.1 % (ref 11.5–14.5)
GLUCOSE SERPL-MCNC: 111 MG/DL (ref 74–99)
GLUCOSE UR STRIP.AUTO-MCNC: NORMAL MG/DL
HCG UR QL IA.RAPID: NEGATIVE
HCT VFR BLD AUTO: 46.2 % (ref 36–46)
HGB BLD-MCNC: 15.4 G/DL (ref 12–16)
IMM GRANULOCYTES # BLD AUTO: 0.02 X10*3/UL (ref 0–0.7)
IMM GRANULOCYTES NFR BLD AUTO: 0.3 % (ref 0–0.9)
KETONES UR STRIP.AUTO-MCNC: ABNORMAL MG/DL
LEUKOCYTE ESTERASE UR QL STRIP.AUTO: NEGATIVE
LYMPHOCYTES # BLD AUTO: 2.34 X10*3/UL (ref 1.2–4.8)
LYMPHOCYTES NFR BLD AUTO: 30.7 %
MAGNESIUM SERPL-MCNC: 1.85 MG/DL (ref 1.6–2.4)
MCH RBC QN AUTO: 30.4 PG (ref 26–34)
MCHC RBC AUTO-ENTMCNC: 33.3 G/DL (ref 32–36)
MCV RBC AUTO: 91 FL (ref 80–100)
MONOCYTES # BLD AUTO: 0.55 X10*3/UL (ref 0.1–1)
MONOCYTES NFR BLD AUTO: 7.2 %
MUCOUS THREADS #/AREA URNS AUTO: ABNORMAL /LPF
NEUTROPHILS # BLD AUTO: 3.97 X10*3/UL (ref 1.2–7.7)
NEUTROPHILS NFR BLD AUTO: 52.2 %
NITRITE UR QL STRIP.AUTO: NEGATIVE
NRBC BLD-RTO: 0 /100 WBCS (ref 0–0)
PH UR STRIP.AUTO: 6 [PH]
PLATELET # BLD AUTO: 219 X10*3/UL (ref 150–450)
POTASSIUM SERPL-SCNC: 3.8 MMOL/L (ref 3.5–5.3)
PROT SERPL-MCNC: 8.3 G/DL (ref 6.4–8.2)
PROT UR STRIP.AUTO-MCNC: ABNORMAL MG/DL
RBC # BLD AUTO: 5.07 X10*6/UL (ref 4–5.2)
RBC # UR STRIP.AUTO: ABNORMAL /UL
RBC #/AREA URNS AUTO: ABNORMAL /HPF
SARS-COV-2 RNA RESP QL NAA+PROBE: NOT DETECTED
SODIUM SERPL-SCNC: 137 MMOL/L (ref 136–145)
SP GR UR STRIP.AUTO: 1.03
SQUAMOUS #/AREA URNS AUTO: ABNORMAL /HPF
TSH SERPL-ACNC: 1.2 MIU/L (ref 0.44–3.98)
UROBILINOGEN UR STRIP.AUTO-MCNC: NORMAL MG/DL
WBC # BLD AUTO: 7.6 X10*3/UL (ref 4.4–11.3)
WBC #/AREA URNS AUTO: ABNORMAL /HPF

## 2024-09-30 PROCEDURE — 71046 X-RAY EXAM CHEST 2 VIEWS: CPT

## 2024-09-30 PROCEDURE — 85379 FIBRIN DEGRADATION QUANT: CPT | Performed by: NURSE PRACTITIONER

## 2024-09-30 PROCEDURE — 85025 COMPLETE CBC W/AUTO DIFF WBC: CPT | Performed by: NURSE PRACTITIONER

## 2024-09-30 PROCEDURE — 83735 ASSAY OF MAGNESIUM: CPT | Performed by: NURSE PRACTITIONER

## 2024-09-30 PROCEDURE — 84484 ASSAY OF TROPONIN QUANT: CPT | Performed by: NURSE PRACTITIONER

## 2024-09-30 PROCEDURE — 36415 COLL VENOUS BLD VENIPUNCTURE: CPT | Performed by: NURSE PRACTITIONER

## 2024-09-30 PROCEDURE — 99203 OFFICE O/P NEW LOW 30 MIN: CPT | Performed by: PHYSICIAN ASSISTANT

## 2024-09-30 PROCEDURE — 80053 COMPREHEN METABOLIC PANEL: CPT | Performed by: NURSE PRACTITIONER

## 2024-09-30 PROCEDURE — 71260 CT THORAX DX C+: CPT | Performed by: RADIOLOGY

## 2024-09-30 PROCEDURE — 84443 ASSAY THYROID STIM HORMONE: CPT | Performed by: NURSE PRACTITIONER

## 2024-09-30 PROCEDURE — 2550000001 HC RX 255 CONTRASTS: Performed by: NURSE PRACTITIONER

## 2024-09-30 PROCEDURE — 83880 ASSAY OF NATRIURETIC PEPTIDE: CPT | Performed by: NURSE PRACTITIONER

## 2024-09-30 PROCEDURE — 87635 SARS-COV-2 COVID-19 AMP PRB: CPT | Performed by: NURSE PRACTITIONER

## 2024-09-30 PROCEDURE — 81025 URINE PREGNANCY TEST: CPT | Performed by: NURSE PRACTITIONER

## 2024-09-30 PROCEDURE — 81001 URINALYSIS AUTO W/SCOPE: CPT | Performed by: NURSE PRACTITIONER

## 2024-09-30 PROCEDURE — 99285 EMERGENCY DEPT VISIT HI MDM: CPT

## 2024-09-30 PROCEDURE — 74177 CT ABD & PELVIS W/CONTRAST: CPT

## 2024-09-30 PROCEDURE — 74177 CT ABD & PELVIS W/CONTRAST: CPT | Performed by: RADIOLOGY

## 2024-09-30 PROCEDURE — 81025 URINE PREGNANCY TEST: CPT | Performed by: EMERGENCY MEDICINE

## 2024-09-30 PROCEDURE — 71046 X-RAY EXAM CHEST 2 VIEWS: CPT | Performed by: RADIOLOGY

## 2024-09-30 PROCEDURE — 93005 ELECTROCARDIOGRAM TRACING: CPT

## 2024-09-30 PROCEDURE — 93000 ELECTROCARDIOGRAM COMPLETE: CPT | Performed by: PHYSICIAN ASSISTANT

## 2024-09-30 ASSESSMENT — PAIN - FUNCTIONAL ASSESSMENT: PAIN_FUNCTIONAL_ASSESSMENT: 0-10

## 2024-09-30 ASSESSMENT — ENCOUNTER SYMPTOMS
ABDOMINAL PAIN: 0
CHEST TIGHTNESS: 1
SORE THROAT: 0
WHEEZING: 0
SHORTNESS OF BREATH: 1
BLOOD IN STOOL: 0
NAUSEA: 0
PALPITATIONS: 1
RHINORRHEA: 0
CHILLS: 0
FEVER: 0
COUGH: 0
VOMITING: 0

## 2024-09-30 ASSESSMENT — PAIN SCALES - GENERAL: PAINLEVEL_OUTOF10: 5 - MODERATE PAIN

## 2024-09-30 ASSESSMENT — PAIN DESCRIPTION - ORIENTATION: ORIENTATION: MID

## 2024-09-30 ASSESSMENT — PAIN DESCRIPTION - DESCRIPTORS
DESCRIPTORS: PRESSURE
DESCRIPTORS: PRESSURE

## 2024-09-30 ASSESSMENT — COLUMBIA-SUICIDE SEVERITY RATING SCALE - C-SSRS
2. HAVE YOU ACTUALLY HAD ANY THOUGHTS OF KILLING YOURSELF?: NO
6. HAVE YOU EVER DONE ANYTHING, STARTED TO DO ANYTHING, OR PREPARED TO DO ANYTHING TO END YOUR LIFE?: NO
1. IN THE PAST MONTH, HAVE YOU WISHED YOU WERE DEAD OR WISHED YOU COULD GO TO SLEEP AND NOT WAKE UP?: NO

## 2024-09-30 ASSESSMENT — PAIN DESCRIPTION - LOCATION: LOCATION: CHEST

## 2024-09-30 NOTE — Clinical Note
Nelda Greenfield was seen and treated in our emergency department on 9/30/2024.  She may return to work on 10/01/2024.       If you have any questions or concerns, please don't hesitate to call.      Rubén Escobar, DO

## 2024-09-30 NOTE — ED PROVIDER NOTES
HPI   Chief Complaint   Patient presents with    Chest Pain       34-year-old female with past history of anxiety presents to the emergency department today for chest pain.  Patient states that she has been having midsternal chest discomfort for the past 4 days.  Sharp stabbing sensation.  Pain does slightly worsen with a deep breath.  Worse when she lies flat.  No relieving factors.  She is not on any OCPs.  No recent long distance travel or history of clotting disorders.  Denies any lower extremity swelling.  She does have some nasal congestion.  A cough that is nonproductive in nature.  No dizziness, headache or syncope.  Denies any abdominal or back pain.  Denies any urinary symptoms or fever.  Generalized body aches and cramping.                          Garvin Coma Scale Score: 15                  Patient History   Past Medical History:   Diagnosis Date    Personal history of other diseases of the respiratory system     History of allergic rhinitis    Personal history of other mental and behavioral disorders     History of anxiety    Personal history of other mental and behavioral disorders 02/16/2021    History of anxiety     Past Surgical History:   Procedure Laterality Date    OTHER SURGICAL HISTORY  09/27/2019    [place] tooth extraction    OTHER SURGICAL HISTORY  09/27/2019    Surgery    OTHER SURGICAL HISTORY  12/15/2022    Endoscopy    OTHER SURGICAL HISTORY  02/16/2021    Colonoscopy     Family History   Problem Relation Name Age of Onset    Leukemia Maternal Grandmother       Social History     Tobacco Use    Smoking status: Every Day     Current packs/day: 0.50     Types: Cigarettes    Smokeless tobacco: Never   Vaping Use    Vaping status: Never Used   Substance Use Topics    Alcohol use: Yes     Alcohol/week: 3.0 - 4.0 standard drinks of alcohol     Types: 3 - 4 Standard drinks or equivalent per week    Drug use: Never       Physical Exam   ED Triage Vitals [09/30/24 1209]   Temperature Heart Rate  Respirations BP   36.7 °C (98.1 °F) 62 16 128/88      Pulse Ox Temp Source Heart Rate Source Patient Position   99 % Temporal -- --      BP Location FiO2 (%)     -- --       Physical Exam  Vitals and nursing note reviewed.   Constitutional:       General: She is not in acute distress.     Appearance: Normal appearance. She is not toxic-appearing.   HENT:      Right Ear: Tympanic membrane normal.      Left Ear: Tympanic membrane normal.      Mouth/Throat:      Mouth: Mucous membranes are moist.      Pharynx: Oropharynx is clear.   Eyes:      Extraocular Movements: Extraocular movements intact.      Pupils: Pupils are equal, round, and reactive to light.   Cardiovascular:      Rate and Rhythm: Normal rate and regular rhythm.      Pulses: Normal pulses.      Heart sounds: Normal heart sounds.   Pulmonary:      Effort: Pulmonary effort is normal.      Breath sounds: Normal breath sounds.   Chest:      Chest wall: Tenderness (Tenderness with no overlying skin changes) present.   Abdominal:      General: Abdomen is flat. Bowel sounds are normal.      Palpations: Abdomen is soft.      Tenderness: There is no abdominal tenderness.   Musculoskeletal:         General: Normal range of motion.      Cervical back: Normal range of motion and neck supple.   Skin:     General: Skin is warm and dry.      Capillary Refill: Capillary refill takes less than 2 seconds.   Neurological:      General: No focal deficit present.      Mental Status: She is alert and oriented to person, place, and time.   Psychiatric:         Mood and Affect: Mood normal.         Behavior: Behavior normal.         Judgment: Judgment normal.         Labs Reviewed   URINALYSIS WITH REFLEX CULTURE AND MICROSCOPIC    Narrative:     The following orders were created for panel order Urinalysis with Reflex Culture and Microscopic.  Procedure                               Abnormality         Status                     ---------                                -----------         ------                     Urinalysis with Reflex C...[[id number]]                                                 Extra Urine Gray Tube[[id number]]                                                         Please view results for these tests on the individual orders.   SARS-COV-2 PCR   CBC WITH AUTO DIFFERENTIAL   COMPREHENSIVE METABOLIC PANEL   MAGNESIUM   TROPONIN SERIES- (INITIAL, 1 HR)    Narrative:     The following orders were created for panel order Troponin I Series, High Sensitivity (0, 1 HR).  Procedure                               Abnormality         Status                     ---------                               -----------         ------                     Troponin I, High Sensiti...[[id number]]                                                   Please view results for these tests on the individual orders.   B-TYPE NATRIURETIC PEPTIDE   D-DIMER, VTE EXCLUSION   URINALYSIS WITH REFLEX CULTURE AND MICROSCOPIC   EXTRA URINE GRAY TUBE   SERIAL TROPONIN-INITIAL     Pain Management Panel  More data exists         Latest Ref Rng & Units 1/3/2024 12/20/2022   Pain Management Panel   Amphetamine Screen, Urine Presumptive Negative Presumptive Positive  PRESUMPTIVE POSITIVE    Barbiturate Screen, Urine Presumptive Negative Presumptive Negative  PRESUMPTIVE NEGATIVE    Codeine IgE <50 ng/mL <50  <50    Hydromorphone Urine <25 ng/mL <25  <25    Morphine  <50 ng/mL <50  <50       Details                 XR chest 2 views    (Results Pending)       ED Course & MDM   ED Course as of 09/30/24 1658   Mon Sep 30, 2024   1333 EKG sinus rhythm at a rate of 71  QRS 79 QTc 447 there is no ST elevation or axis deviation [RB]   1417 D-dimer of 360.  Low risk Wells score.  PE excluded at this time. [RB]   1521 Discussed x-ray results with the patient at this time.  At this time she does notify me she does have a history of lymphoma.  Has been in remission for 11 years.  CT chest abdomen pelvis ordered at  this time. [RB]   1649 D-Dimer, Quantitative VTE Exclusion: 360  CT PE study considered but the patient's Wells score is low risk and has a negative D-Dimer. Does not require further workup for pulmonary embolic disease.   [WJ]   1650 The patient seen and examined with the nurse practitioner/physician assistant. I personally saw the patient and made/approved the management plan and take responsibility for the patient management.    History: 34-year-old female with history of prior lymphoma, neuropathy secondary to chemotherapy, and stomach ulcer on antacid presents to the emergency department for chest pain.  States that had gradual worsening of chest pain for the last 4 days.  Described as a constant sensation that reminds her of a prior pneumonia infection.  States that she denied any fever, chills, cough, new leg swelling.  No history of prior pulmonary embolism or DVT.  Exam: No significant leg swelling, pitting edema, clear lung sounds bilaterally  MDM: Patient presented to the emergency department for chest pain.  Cardiac workup has been initiated as this did not sound like cardiac in nature.  Patient vital signs did not suggest signs of sepsis or hypoxia.  Troponin and D-dimer negative.  Chest x-ray did show possible calcification, mass, lymph node that required further evaluation. CT ordered.  See other notes for continuation of MDM.  Patient likely will be okay for discharge.    Reuben Escobar DO  Emergency Medicine [WJ]   1654 Patient CT scan resulted stating that there was a calcified mass consistent with treated lymphoma but no other acute findings.  Patient appropriate for discharge with low heart score, no concern for pulmonary embolism.  Return precautions given.  Patient follow-up primary care physician. [WJ]      ED Course User Index  [RB] Gaye Ellington, APRN-CNP  [WJ] Rubén Escobar DO         Diagnoses as of 09/30/24 1658   Chest pain, unspecified type       Medical Decision Making  On initial  evaluation patient is well-appearing in the emergency department at this time.  EKG is nonischemic.  Imaging and labs are ordered. Troponin is negative.  Urine negative for pregnancy COVID is negative BNP within normal limits CMP, mag, dimer, CBC within normal limits.  No sign of infection in the urine.  X-ray was confirmed concern for mass therefore CT chest abdomen pelvis ordered.  Ultimately CT shows close enteric flattened calcified mass in the anterior superior mediastinum consistent with treated lymphoma but no active mediastinal or axillary adenopathy.  Abdomen pelvis is unremarkable.  I sat down discussed results in depth the patient educated her on close outpatient follow-up and strict return precautions.  She verbalized understanding agreement with plan has no further questions or concerns and patient will be discharged home in stable condition.        Procedure  Procedures      Differential Diagnoses include ACS, PE, pneumonia  This is not an exhaustive list of all the diagnosis and therapeutics are considered during the patient's evaluation for an emergency medical condition.    I discussed the differential, results and discharge plan with the patient and/or family/friend/caregiver if present.  I emphasized the importance of follow-up with the physician I referred them to in the timeframe recommended.  I explained reasons for the patient to return to the Emergency Department. Additional verbal discharge instructions were also given and discussed with the patient to supplement those generated by the EMR. We also discussed medications that were prescribed (if any) including common side effects and interactions. The patient was advised to abstain from driving, operating heavy machinery or making significant decisions while taking medications such as opiates and muscle relaxers that may impair this. All questions were addressed.  They understand return precautions and discharge instructions. The patient  and/or family/friend/caregiver expressed understanding.           Gaye Ellington, APRN-CNP  09/30/24 8862

## 2024-09-30 NOTE — PROGRESS NOTES
Subjective   Patient ID: Nelda Greenfield is a 34 y.o. female. They present today with a chief complaint of Palpitations (C/o palpitations, chest pounding with pain x 4 days).    History of Present Illness  Patient is a 34 year old female who presents today with a four day history of pounding heart and palpitations. She states today she developed left greater than right chest pain. She states this pain is radiating to her neck and causing it to feel swollen. She feels shaky, chest tightness and shortness of breath. She states she did have some chills yesterday though state she does not feel sick. She states she does have a known history of stomach ulcers and has been taking omeprazole for several years. She denies any fevers, wheezing, cough, nasal congestion, runny nose, heartburn, nausea, vomiting or anxiety. She does smoke about 1/2 a pack a day. She stages a family history of blood clots though has no personal history of DVT or PE. She does not use oral contraceptives. She has not attempted treatment prior to arrival. She denies any history of injury to her chest.       Palpitations  Associated symptoms: chest pain and shortness of breath    Associated symptoms: no cough, no nausea and no vomiting        Past Medical History  Allergies as of 09/30/2024 - Reviewed 09/30/2024   Allergen Reaction Noted    Codeine Other, Hives, Rash, and Swelling 05/20/2016       (Not in a hospital admission)       Past Medical History:   Diagnosis Date    Personal history of other diseases of the respiratory system     History of allergic rhinitis    Personal history of other mental and behavioral disorders     History of anxiety    Personal history of other mental and behavioral disorders 02/16/2021    History of anxiety       Past Surgical History:   Procedure Laterality Date    OTHER SURGICAL HISTORY  09/27/2019    [place] tooth extraction    OTHER SURGICAL HISTORY  09/27/2019    Surgery    OTHER SURGICAL HISTORY  12/15/2022     Endoscopy    OTHER SURGICAL HISTORY  02/16/2021    Colonoscopy        reports that she has been smoking cigarettes. She has never used smokeless tobacco. She reports current alcohol use of about 3.0 - 4.0 standard drinks of alcohol per week. She reports that she does not use drugs.    Review of Systems  Review of Systems   Constitutional:  Negative for chills and fever.   HENT:  Negative for congestion, rhinorrhea and sore throat.    Respiratory:  Positive for chest tightness and shortness of breath. Negative for cough and wheezing.    Cardiovascular:  Positive for chest pain and palpitations. Negative for leg swelling.   Gastrointestinal:  Negative for abdominal pain, blood in stool, nausea and vomiting.                                  Objective    Vitals:    09/30/24 1115   BP: 144/81   Pulse: 64   Resp: 16   Temp: 36.1 °C (97 °F)   SpO2: 100%     No LMP recorded.    Physical Exam  Vitals and nursing note reviewed.   Constitutional:       General: She is not in acute distress.     Appearance: Normal appearance.   HENT:      Head: Normocephalic.      Right Ear: Tympanic membrane and ear canal normal.      Left Ear: Tympanic membrane and ear canal normal.      Nose: No congestion or rhinorrhea.      Mouth/Throat:      Mouth: Mucous membranes are moist.      Pharynx: No oropharyngeal exudate or posterior oropharyngeal erythema.   Cardiovascular:      Rate and Rhythm: Normal rate and regular rhythm.      Heart sounds: Normal heart sounds.   Pulmonary:      Effort: Pulmonary effort is normal. No respiratory distress.      Breath sounds: Normal breath sounds. No wheezing, rhonchi or rales.   Chest:      Comments: TTP to sternal chest  Abdominal:      General: Abdomen is flat. Bowel sounds are normal.      Palpations: Abdomen is soft.      Tenderness: There is no abdominal tenderness. There is no guarding or rebound.   Lymphadenopathy:      Cervical: No cervical adenopathy.   Neurological:      Mental Status: She is  alert.         Procedures    Point of Care Test & Imaging Results from this visit  No results found for this visit on 09/30/24.   No results found.    Diagnostic study results (if any) were reviewed by Alina Arroyo PA-C.    Assessment/Plan   Allergies, medications, history, and pertinent labs/EKGs/Imaging reviewed by Alina Arroyo PA-C.     Medical Decision Making  MDM- History and physical exam of suggestive of possible cardiac abnormality. Case and EKG reviewed with supervising physician. Patient verbalized understanding of emergent need for evaluation. Patient is transferred to ER for further evaluation and care. Patient verbalized understanding and agrees with plan in ER transfer.      Orders and Diagnoses  There are no diagnoses linked to this encounter.    Medical Admin Record      Patient disposition: ED    Electronically signed by Alina Arroyo PA-C  11:33 AM

## 2024-10-01 LAB
ATRIAL RATE: 72 BPM
HOLD SPECIMEN: NORMAL
P AXIS: 69 DEGREES
PR INTERVAL: 132 MS
Q ONSET: 252 MS
QRS COUNT: 11 BEATS
QRS DURATION: 79 MS
QT INTERVAL: 411 MS
QTC CALCULATION(BAZETT): 447 MS
QTC FREDERICIA: 434 MS
R AXIS: 57 DEGREES
T AXIS: 41 DEGREES
T OFFSET: 457 MS
VENTRICULAR RATE: 71 BPM

## 2024-10-08 ENCOUNTER — APPOINTMENT (OUTPATIENT)
Dept: PRIMARY CARE | Facility: CLINIC | Age: 34
End: 2024-10-08
Payer: COMMERCIAL

## 2024-10-08 VITALS
SYSTOLIC BLOOD PRESSURE: 120 MMHG | OXYGEN SATURATION: 99 % | DIASTOLIC BLOOD PRESSURE: 86 MMHG | WEIGHT: 173 LBS | HEIGHT: 70 IN | BODY MASS INDEX: 24.77 KG/M2 | HEART RATE: 86 BPM

## 2024-10-08 DIAGNOSIS — R00.2 HEART PALPITATIONS: Primary | ICD-10-CM

## 2024-10-08 DIAGNOSIS — Z23 FLU VACCINE NEED: ICD-10-CM

## 2024-10-08 DIAGNOSIS — F41.9 ANXIETY: ICD-10-CM

## 2024-10-08 PROCEDURE — 3008F BODY MASS INDEX DOCD: CPT | Performed by: NURSE PRACTITIONER

## 2024-10-08 PROCEDURE — 90656 IIV3 VACC NO PRSV 0.5 ML IM: CPT | Performed by: NURSE PRACTITIONER

## 2024-10-08 PROCEDURE — 99214 OFFICE O/P EST MOD 30 MIN: CPT | Performed by: NURSE PRACTITIONER

## 2024-10-08 PROCEDURE — 90471 IMMUNIZATION ADMIN: CPT | Performed by: NURSE PRACTITIONER

## 2024-10-08 RX ORDER — PROPRANOLOL HYDROCHLORIDE 20 MG/1
20 TABLET ORAL 2 TIMES DAILY
Qty: 60 TABLET | Refills: 1 | Status: SHIPPED | OUTPATIENT
Start: 2024-10-08 | End: 2025-04-06

## 2024-10-08 ASSESSMENT — ENCOUNTER SYMPTOMS
COUGH: 0
CHEST TIGHTNESS: 1
NERVOUS/ANXIOUS: 1
GASTROINTESTINAL NEGATIVE: 1
MUSCULOSKELETAL NEGATIVE: 1
PALPITATIONS: 1
NEUROLOGICAL NEGATIVE: 1
CONSTITUTIONAL NEGATIVE: 1
WHEEZING: 0
SHORTNESS OF BREATH: 1

## 2024-10-08 NOTE — PROGRESS NOTES
"Subjective   Patient ID: Nelda Greenfield is a 34 y.o. female who presents for ER Follow-up (ER on 9/30/24 Hx of anxiety, presented with chest pain occurring x 4 days. Per pt she said she's feeling better but still has some sx. ER informed her it was viral. Chest xray and ct were completed. /Lov 6/4/24/No future ov scheduled ).    HPI   Patient here for ER follow up. Last office visit on 06/04/2024  Current concern  1) ER visit on 09/30/2024 chest pain, CT shows enteric flattened calcified mass anterior superior mediastinum, consistent with treated lymphoma, not no acitve mediastinal or axillary adenopathy.   Today Enmanuel continues to have chest palpitations- especially with exertion and feeling winded with walking up flight of stairs- more than previously.  Still feels heavy in chest. Continues to smoke 1/2 ppd- knows she should quit.   Chronic concerns: ADHD, Anxiety, IBS, GERD,  Peripheral neuropathy d/t chemotherapy, vitamin D deficiency, Seasonal Allergies, abdominal cramping, Pruritius  Specialist  - Gastroenterologist  - Allergist Dr Pham  - GYN- yearly  Labs Through ER 09/30/2024  SMOKER- 1/2 PPD  Caffeine- not much.      Review of Systems   Constitutional: Negative.    Respiratory:  Positive for chest tightness and shortness of breath. Negative for cough and wheezing.    Cardiovascular:  Positive for palpitations.   Gastrointestinal: Negative.    Musculoskeletal: Negative.    Neurological: Negative.    Psychiatric/Behavioral:  The patient is nervous/anxious.      Objective   /86 (BP Location: Left arm, Patient Position: Sitting, BP Cuff Size: Adult)   Pulse 86   Ht 1.778 m (5' 10\")   Wt 78.5 kg (173 lb)   LMP 09/23/2024   SpO2 99%   BMI 24.82 kg/m²   Weight 169.9 lbs     Physical Exam  Vitals reviewed.   Constitutional:       Appearance: She is normal weight.   Cardiovascular:      Rate and Rhythm: Normal rate and regular rhythm.      Heart sounds: Normal heart sounds.   Pulmonary:      " Effort: Pulmonary effort is normal.      Breath sounds: Normal breath sounds. No decreased breath sounds, wheezing or rhonchi.   Musculoskeletal:      Right lower leg: No edema.      Left lower leg: No edema.   Neurological:      General: No focal deficit present.      Mental Status: She is alert.   Psychiatric:         Attention and Perception: Attention normal.         Behavior: Behavior normal. Behavior is cooperative.       Assessment/Plan   Labs - 02/22/2023 routine- ER 09/30/2024 reviewed with patient- overall unremarkable.   Influenza- declined   Prevnar 13/20- Not indicated   Shingrix- Not indicated   Colonoscopy- 09/09/2022  Cervical CA screen - GYN  Mammogram not indicated  Diagnoses and all orders for this visit:  Heart palpitations / Anxiety  -     Holter or Event Cardiac Monitor; Future  -     Transthoracic Echo (TTE) Complete; Future  - initiated  propranolol (Inderal) 20 mg tablet; Take 1 tablet (20 mg) by mouth 2 times a day.  Flu vaccine need  -     Flu vaccine, trivalent, preservative free, age 6 months and greater (Fluarix/Fluzone/Flulaval)    PLAN: Follow up 4 weeks.

## 2024-10-28 ENCOUNTER — HOSPITAL ENCOUNTER (OUTPATIENT)
Dept: CARDIOLOGY | Facility: HOSPITAL | Age: 34
Discharge: HOME | End: 2024-10-28
Payer: COMMERCIAL

## 2024-10-28 DIAGNOSIS — R00.2 HEART PALPITATIONS: ICD-10-CM

## 2024-10-28 PROCEDURE — 93306 TTE W/DOPPLER COMPLETE: CPT

## 2024-10-28 PROCEDURE — 93306 TTE W/DOPPLER COMPLETE: CPT | Performed by: INTERNAL MEDICINE

## 2024-10-29 ENCOUNTER — APPOINTMENT (OUTPATIENT)
Dept: CARDIOLOGY | Facility: HOSPITAL | Age: 34
End: 2024-10-29
Payer: COMMERCIAL

## 2024-10-29 ENCOUNTER — HOSPITAL ENCOUNTER (OUTPATIENT)
Dept: CARDIOLOGY | Facility: HOSPITAL | Age: 34
Discharge: HOME | End: 2024-10-29
Payer: COMMERCIAL

## 2024-10-29 DIAGNOSIS — R00.2 HEART PALPITATIONS: ICD-10-CM

## 2024-10-29 LAB
AORTIC VALVE MEAN GRADIENT: 4.6 MMHG
AORTIC VALVE PEAK VELOCITY: 1.39 M/S
AV PEAK GRADIENT: 7.7 MMHG
EJECTION FRACTION APICAL 4 CHAMBER: 64.4
EJECTION FRACTION: 62 %
GLOBAL LONGITUDINAL STRAIN: 18.2 %
LEFT ATRIUM VOLUME AREA LENGTH INDEX BSA: 29.7 ML/M2
LEFT VENTRICLE INTERNAL DIMENSION DIASTOLE: 4.81 CM (ref 3.5–6)
LEFT VENTRICULAR OUTFLOW TRACT DIAMETER: 1.77 CM
LV EJECTION FRACTION BIPLANE: 62 %
MITRAL VALVE E/A RATIO: 1.61
MITRAL VALVE E/E' RATIO: 7.57
RIGHT VENTRICLE FREE WALL PEAK S': 14.14 CM/S
RIGHT VENTRICLE PEAK SYSTOLIC PRESSURE: 20.5 MMHG
TRICUSPID ANNULAR PLANE SYSTOLIC EXCURSION: 2.1 CM

## 2024-10-29 PROCEDURE — 93225 XTRNL ECG REC<48 HRS REC: CPT

## 2024-11-05 ENCOUNTER — APPOINTMENT (OUTPATIENT)
Dept: PRIMARY CARE | Facility: CLINIC | Age: 34
End: 2024-11-05
Payer: COMMERCIAL

## 2024-11-05 VITALS
OXYGEN SATURATION: 99 % | SYSTOLIC BLOOD PRESSURE: 110 MMHG | DIASTOLIC BLOOD PRESSURE: 74 MMHG | BODY MASS INDEX: 25 KG/M2 | HEART RATE: 78 BPM | WEIGHT: 174.6 LBS | HEIGHT: 70 IN

## 2024-11-05 DIAGNOSIS — T45.1X5A PERIPHERAL NEUROPATHY DUE TO CHEMOTHERAPY (MULTI): ICD-10-CM

## 2024-11-05 DIAGNOSIS — R00.2 HEART PALPITATIONS: ICD-10-CM

## 2024-11-05 DIAGNOSIS — F41.9 ANXIETY: ICD-10-CM

## 2024-11-05 DIAGNOSIS — F17.200 SMOKING ADDICTION: Primary | ICD-10-CM

## 2024-11-05 DIAGNOSIS — G62.0 PERIPHERAL NEUROPATHY DUE TO CHEMOTHERAPY (MULTI): ICD-10-CM

## 2024-11-05 DIAGNOSIS — R06.09 DYSPNEA ON EXERTION: ICD-10-CM

## 2024-11-05 PROCEDURE — 3008F BODY MASS INDEX DOCD: CPT | Performed by: NURSE PRACTITIONER

## 2024-11-05 PROCEDURE — 99214 OFFICE O/P EST MOD 30 MIN: CPT | Performed by: NURSE PRACTITIONER

## 2024-11-05 RX ORDER — GABAPENTIN 300 MG/1
300 CAPSULE ORAL 3 TIMES DAILY
Qty: 90 CAPSULE | Refills: 5 | Status: SHIPPED | OUTPATIENT
Start: 2024-11-05

## 2024-11-05 RX ORDER — PROPRANOLOL HYDROCHLORIDE 20 MG/1
20 TABLET ORAL 2 TIMES DAILY
Qty: 180 TABLET | Refills: 3 | Status: SHIPPED | OUTPATIENT
Start: 2024-11-05

## 2024-11-05 NOTE — PROGRESS NOTES
"Subjective   Patient ID: Nelda Greenfield is a 34 y.o. female who presents for Follow-up (4w/Lov 10/8/24/Holter event scanned in from 10/29/24).    HPI   Patient here for follow up results. Last office visit on 10/08/2024  Current concerns:  1) review of results for evaluation ongoing chest palpitations and dyspnea with exertion.  Had initiated Propranolol 20 mg bid for anxiety and palpitations- since starting this medication Nelda notes no palpitations have occurred. Continues to have SOB with exertion.   Chronic concerns: ADHD, Anxiety, IBS, GERD,  Peripheral neuropathy d/t chemotherapy, vitamin D deficiency, Seasonal Allergies, abdominal cramping, Pruritius  Specialist  - Gastroenterologist  - Allergist Dr Pham  - GYN- yearly  Labs Through ER 09/30/2024  SMOKER- 1/2 PPD- since approximately age 16 yo.   Caffeine- not much.      Review of Systems    Objective   /74 (BP Location: Right arm, Patient Position: Sitting, BP Cuff Size: Adult)   Pulse 78   Ht 1.778 m (5' 10\")   Wt 79.2 kg (174 lb 9.6 oz)   SpO2 99%   BMI 25.05 kg/m²   Weight last appt 173 lbs.     Physical Exam    Assessment/Plan   Labs - 02/22/2023 routine   Influenza- 10/08/2024   Prevnar 13/20- Not indicated   Shingrix- Not indicated   Colonoscopy- 09/09/2022  Cervical CA screen - GYN  Mammogram not indicated  Holter Event Monitor 10/29/2024-10/31/2024- not concerning arhythmia, predominant rhythm with sinus.    EKG (ER on 09/30/2024) NSR.  Diagnoses and all orders for this visit:  Smoking addiction  / Dyspnea on exertion  -     Complete Pulmonary Function Test (Spirometry/DLCO/Lung Volumes); Future  Smoking 1/2 ppd since age 16 yo, did quit while pregnant. Anxiety increases with thought of quitting smoking  Heart palpitations  / Anxiety  Improved anxiety level and associated heart palpitations  Holter monitor results negative for concerning arhythmia.    -  refilled propranolol (Inderal) 20 mg tablet; Take 1 tablet (20 mg) by " mouth 2 times a day.  Peripheral neuropathy due to chemotherapy (Multi)  -  refilled  gabapentin (Neurontin) 300 mg capsule; Take 1 capsule (300 mg) by mouth 3 times a day.    PLAN: Follow up next summer as wellness  PFT-> communicate results via My Chart, Discussed if patient wants smoking cessation patches etc - contact through MY Chart.

## 2024-11-26 ENCOUNTER — APPOINTMENT (OUTPATIENT)
Dept: RESPIRATORY THERAPY | Facility: HOSPITAL | Age: 34
End: 2024-11-26
Payer: COMMERCIAL

## 2024-12-20 ENCOUNTER — APPOINTMENT (OUTPATIENT)
Dept: RESPIRATORY THERAPY | Facility: HOSPITAL | Age: 34
End: 2024-12-20
Payer: COMMERCIAL

## 2025-01-17 ENCOUNTER — APPOINTMENT (OUTPATIENT)
Dept: RESPIRATORY THERAPY | Facility: HOSPITAL | Age: 35
End: 2025-01-17
Payer: COMMERCIAL

## 2025-02-12 ENCOUNTER — HOSPITAL ENCOUNTER (OUTPATIENT)
Dept: RESPIRATORY THERAPY | Facility: HOSPITAL | Age: 35
Discharge: HOME | End: 2025-02-12
Payer: COMMERCIAL

## 2025-02-12 DIAGNOSIS — F17.200 SMOKING ADDICTION: ICD-10-CM

## 2025-02-12 DIAGNOSIS — R06.09 DYSPNEA ON EXERTION: ICD-10-CM

## 2025-02-12 LAB
MGC ASCENT PFT - FEV1 - PRE: 3.49
MGC ASCENT PFT - FEV1 - PREDICTED: 3.61
MGC ASCENT PFT - FVC - PRE: 4.98
MGC ASCENT PFT - FVC - PREDICTED: 4.41

## 2025-02-12 PROCEDURE — 94010 BREATHING CAPACITY TEST: CPT

## 2025-03-03 DIAGNOSIS — J45.21: Primary | ICD-10-CM

## 2025-03-03 DIAGNOSIS — J44.89: Primary | ICD-10-CM

## 2025-03-03 DIAGNOSIS — R06.09 DYSPNEA ON EXERTION: ICD-10-CM

## 2025-03-03 DIAGNOSIS — F17.200 SMOKING ADDICTION: ICD-10-CM

## 2025-03-28 DIAGNOSIS — F40.01 FEAR OF TRAVEL WITH PANIC ATTACKS: ICD-10-CM

## 2025-03-28 DIAGNOSIS — F41.0 PANIC ATTACK: Primary | ICD-10-CM

## 2025-03-28 RX ORDER — LORAZEPAM 0.5 MG/1
0.5 TABLET ORAL DAILY PRN
Qty: 4 TABLET | Refills: 0 | Status: SHIPPED | OUTPATIENT
Start: 2025-03-28 | End: 2025-04-04

## 2025-04-07 ENCOUNTER — APPOINTMENT (OUTPATIENT)
Dept: PRIMARY CARE | Facility: CLINIC | Age: 35
End: 2025-04-07
Payer: COMMERCIAL

## 2025-04-07 VITALS
OXYGEN SATURATION: 98 % | DIASTOLIC BLOOD PRESSURE: 80 MMHG | HEART RATE: 78 BPM | SYSTOLIC BLOOD PRESSURE: 118 MMHG | WEIGHT: 177.4 LBS | BODY MASS INDEX: 25.4 KG/M2 | HEIGHT: 70 IN

## 2025-04-07 DIAGNOSIS — F41.9 ANXIETY: ICD-10-CM

## 2025-04-07 DIAGNOSIS — R00.2 HEART PALPITATIONS: ICD-10-CM

## 2025-04-07 DIAGNOSIS — K21.00 GASTROESOPHAGEAL REFLUX DISEASE WITH ESOPHAGITIS WITHOUT HEMORRHAGE: ICD-10-CM

## 2025-04-07 DIAGNOSIS — G62.0 PERIPHERAL NEUROPATHY DUE TO CHEMOTHERAPY (MULTI): ICD-10-CM

## 2025-04-07 DIAGNOSIS — R29.898 HEAVY SENSATION OF LOWER EXTREMITY: ICD-10-CM

## 2025-04-07 DIAGNOSIS — K22.10 ULCER OF ESOPHAGUS WITHOUT BLEEDING: ICD-10-CM

## 2025-04-07 DIAGNOSIS — T45.1X5A PERIPHERAL NEUROPATHY DUE TO CHEMOTHERAPY (MULTI): ICD-10-CM

## 2025-04-07 DIAGNOSIS — I87.2 VENOUS INSUFFICIENCY: Primary | ICD-10-CM

## 2025-04-07 PROCEDURE — 3008F BODY MASS INDEX DOCD: CPT | Performed by: NURSE PRACTITIONER

## 2025-04-07 PROCEDURE — 99214 OFFICE O/P EST MOD 30 MIN: CPT | Performed by: NURSE PRACTITIONER

## 2025-04-07 RX ORDER — PROPRANOLOL HYDROCHLORIDE 40 MG/1
40 TABLET ORAL 2 TIMES DAILY
Qty: 60 TABLET | Refills: 0 | Status: SHIPPED | OUTPATIENT
Start: 2025-04-07

## 2025-04-07 RX ORDER — PANTOPRAZOLE SODIUM 40 MG/1
40 TABLET, DELAYED RELEASE ORAL
Qty: 30 TABLET | Refills: 1 | Status: SHIPPED | OUTPATIENT
Start: 2025-04-07 | End: 2025-06-06

## 2025-04-07 RX ORDER — AMOXICILLIN 875 MG/1
1 TABLET, FILM COATED ORAL
COMMUNITY
Start: 2025-03-05

## 2025-04-07 ASSESSMENT — ENCOUNTER SYMPTOMS
CARDIOVASCULAR NEGATIVE: 1
RESPIRATORY NEGATIVE: 1
CONSTITUTIONAL NEGATIVE: 1
NERVOUS/ANXIOUS: 1
GASTROINTESTINAL NEGATIVE: 1
WEAKNESS: 0
NUMBNESS: 1

## 2025-04-07 NOTE — PROGRESS NOTES
"Subjective   Patient ID: Nelda Greenfield is a 35 y.o. female who presents for Anxiety (Pt here to discuss concerns with her anxiety. She feels like it has to do with her circulation. At times she feels like her circulation is so bad that she can't breath. She was dx in [place] with Venous Insufficiency in L leg./Lov 11/5/24/Nov 6/3/25 Yearly) and Earache (Still has pain in her right ear. She gets random sharp pain, still difficult to hear out of that ear. Has tenderness still going down her neck, behind her ear. ).    HPI   Patient here for ongoing anxiety. Last office visit 11/05/2024  Current concern  1) Anxiety, has been very concerned about her venous insufficiency in left leg and believes this may be increasing her anxiety.  Panic attacks continue, - had failed on Zoloft, Lexapro, Citalopram, wellbutrin, Prozac   Chronic concerns: ADHD, Anxiety, IBS, GERD,  Peripheral neuropathy d/t chemotherapy, vitamin D deficiency, Seasonal Allergies, abdominal cramping, Pruritius  Specialist  - Gastroenterologist  - Allergist Dr Pham  - GYN- yearly  Labs Through ER 09/30/2024  SMOKER- 1/2 PPD- since approximately age 16 yo.   Caffeine- not much.      Review of Systems   Constitutional: Negative.    HENT:          Double ear infection - Urgent Care one month ago, right ear continues  Antibiotic and then Prednisone.    Respiratory: Negative.     Cardiovascular: Negative.    Gastrointestinal: Negative.    Neurological:  Positive for numbness (bilateal feet and hands). Negative for weakness.   Psychiatric/Behavioral:  The patient is nervous/anxious (more panic attacks.).      Objective   /80 (BP Location: Right arm, Patient Position: Sitting, BP Cuff Size: Adult)   Pulse 78   Ht 1.778 m (5' 10\")   Wt 80.5 kg (177 lb 6.4 oz)   SpO2 98%   BMI 25.45 kg/m²   Weight in November 174.9 lbs   Physical Exam  Vitals reviewed.   HENT:      Right Ear: Tympanic membrane and ear canal normal.      Left Ear: Tympanic membrane " and ear canal normal.      Nose: Nose normal.      Mouth/Throat:      Lips: Pink.   Eyes:      General: Lids are normal.      Conjunctiva/sclera: Conjunctivae normal.   Neck:      Thyroid: No thyromegaly.        Comments: Area if tight muscle  Cardiovascular:      Rate and Rhythm: Normal rate and regular rhythm.      Heart sounds: Normal heart sounds.   Pulmonary:      Effort: Pulmonary effort is normal.      Breath sounds: Normal breath sounds.   Musculoskeletal:      Cervical back: Neck supple.   Lymphadenopathy:      Head:      Right side of head: No submental, tonsillar, preauricular, posterior auricular or occipital adenopathy.      Left side of head: No tonsillar, preauricular, posterior auricular or occipital adenopathy.      Cervical: Cervical adenopathy present.   Neurological:      Mental Status: She is alert.   Psychiatric:         Attention and Perception: Attention normal.         Mood and Affect: Mood is anxious.         Behavior: Behavior normal. Behavior is cooperative.       Assessment/Plan   Labs - 02/22/2023 routine   Influenza- 10/08/2024   Prevnar 13/20- Not indicated   Shingrix- Not indicated   Colonoscopy- 09/09/2022  Cervical CA screen - GYN  Mammogram not indicated  Holter Event Monitor 10/29/2024-10/31/2024- not concerning arhythmia, predominant rhythm with sinus.    EKG (ER on 09/30/2024) NSR.    Diagnoses and all orders for this visit:  Venous insufficiency  -     Referral to Vascular Surgery; Future  Dr  CHO   Peripheral neuropathy due to chemotherapy (Multi)  -     Referral to Neurology; Future  Ko,   Heart palpitations  -     propranolol (Inderal) 40 mg tablet; Take 1 tablet (40 mg) by mouth 2 times a day.  Anxiety  -     Referral to Psychiatry; Future  Dr. COSTA,   - Increased dose of   propranolol (Inderal) 20 mg-> 40 mg tablet; Take 1 tablet (40 mg) by mouth 2 times a day.  Gastroesophageal reflux disease with esophagitis without hemorrhage  -     Referral to  Gastroenterology; Future   SAM FREGOSO   Ulcer of esophagus without bleeding  - initiated  pantoprazole (ProtoNix) 40 mg EC tablet; Take 1 tablet (40 mg) by mouth once daily in the morning. Take before meals. Do not crush, chew, or split.  Discontinue Omeprazole   Heavy sensation of lower extremity  -     Referral to Vascular Surgery; Future  PLAN: follow up June as scheduled

## 2025-04-07 NOTE — PATIENT INSTRUCTIONS
Propanol increased to 40 mg twice daily    Start on Pantoprazole 40 mg once daily.   Start Flonase daily & Benadryl (at night).     Vascular Specialist Dr Leonel Dickinson  Neurology specialist  Dr Meneses   Psychiatry  Dr Dieudonne TYLER referral

## 2025-04-21 ENCOUNTER — OFFICE VISIT (OUTPATIENT)
Dept: PULMONOLOGY | Facility: CLINIC | Age: 35
End: 2025-04-21
Payer: COMMERCIAL

## 2025-04-21 VITALS
BODY MASS INDEX: 25.34 KG/M2 | TEMPERATURE: 98.1 F | WEIGHT: 177 LBS | SYSTOLIC BLOOD PRESSURE: 103 MMHG | HEIGHT: 70 IN | OXYGEN SATURATION: 99 % | DIASTOLIC BLOOD PRESSURE: 71 MMHG | RESPIRATION RATE: 16 BRPM | HEART RATE: 65 BPM

## 2025-04-21 DIAGNOSIS — J30.1 SEASONAL ALLERGIC RHINITIS DUE TO POLLEN: Primary | ICD-10-CM

## 2025-04-21 DIAGNOSIS — J45.30: ICD-10-CM

## 2025-04-21 DIAGNOSIS — F17.200 TOBACCO USE DISORDER: ICD-10-CM

## 2025-04-21 DIAGNOSIS — R06.09 DYSPNEA ON EXERTION: ICD-10-CM

## 2025-04-21 DIAGNOSIS — J44.89: ICD-10-CM

## 2025-04-21 PROBLEM — R45.0 JITTERY FEELING: Status: RESOLVED | Noted: 2023-05-30 | Resolved: 2025-04-21

## 2025-04-21 PROBLEM — L50.8 PHYSICAL URTICARIA: Status: RESOLVED | Noted: 2023-05-30 | Resolved: 2025-04-21

## 2025-04-21 PROCEDURE — 99204 OFFICE O/P NEW MOD 45 MIN: CPT | Performed by: STUDENT IN AN ORGANIZED HEALTH CARE EDUCATION/TRAINING PROGRAM

## 2025-04-21 PROCEDURE — 99214 OFFICE O/P EST MOD 30 MIN: CPT | Performed by: STUDENT IN AN ORGANIZED HEALTH CARE EDUCATION/TRAINING PROGRAM

## 2025-04-21 PROCEDURE — 3008F BODY MASS INDEX DOCD: CPT | Performed by: STUDENT IN AN ORGANIZED HEALTH CARE EDUCATION/TRAINING PROGRAM

## 2025-04-21 RX ORDER — FLUTICASONE FUROATE AND VILANTEROL 100; 25 UG/1; UG/1
1 POWDER RESPIRATORY (INHALATION) DAILY
Qty: 60 EACH | Refills: 5 | Status: SHIPPED | OUTPATIENT
Start: 2025-04-21

## 2025-04-21 RX ORDER — ALBUTEROL SULFATE AND BUDESONIDE 90; 80 UG/1; UG/1
2 AEROSOL, METERED RESPIRATORY (INHALATION) EVERY 6 HOURS PRN
Qty: 10.7 G | Refills: 11 | Status: SHIPPED | OUTPATIENT
Start: 2025-04-21

## 2025-04-21 ASSESSMENT — ENCOUNTER SYMPTOMS
WHEEZING: 1
FEVER: 0
MYALGIAS: 0
CHILLS: 0
SHORTNESS OF BREATH: 1
COUGH: 1
RHINORRHEA: 1
VOICE CHANGE: 0
SLEEP DISTURBANCE: 0
SINUS PRESSURE: 1
TROUBLE SWALLOWING: 0

## 2025-04-21 NOTE — PROGRESS NOTES
Subjective   Patient ID: Nelda Greenfield is a 35 y.o. female who presents for No chief complaint on file..  HPI  Ms. Greenfield is a 35yoF current smoker (1/2ppd since age 15) with history of GERD, ADHD, Hodgkin's lymphoma s/p treatment in 2014 c/b neuropathy (in remission), anxiety, and chronic dyspnea presenting for evaluation of abnormal PFTs.    Ms. Greenfield reports that she is having dyspnea on exertion for the past 2 years after an episode of pneumonia (treated as an outpatient) which has progressively worsened. She is also having a dry cough and wheezing over the same time period. She has about 1 flight of stairs tolerance which is consistent day to day. Her breathing is worse in the heat and when she has a URI. She has springtime allergies with prior allergy test positive for tree pollen and she takes xyzal daily and is currently on flonase after an ear infection to help with continued ear pressure/plugging. She completed antibiotics for her ear infection and is currently suffering from another URI. She reports that she has congestion which is worse when laying flat and she awakens with morning congestion and coughing which improves some once she is out of bed. She does also post nasal drainage. She has stomach ulcers with symptomatic reflux currently and is on protonix and pending a GI evaluation and she feels her GERD may be contributing some to her breathing issues.     Regarding her history, she had childhood eczema and allergies but no asthma. Has hives/ dermatographia which is being treated with Xyzal (though it is not resolved).     Soc/exposures: smokes 1/2ppd since age 15, quit for 4y, then restarted 6y ago. No vaping. 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 No . 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     FH: unknown if lung  disease, m grandmother - leukemia, p grandmother - breast ca    Labs: no eosinophilia, ANGELICA/MORE negative 2/2023    Imaging:  CT chest 9/2024: anterosuperior mediastinal calcified mass c/w treated lymphoma    PFT 2/2025: FEV1/FVC reduced, FEV1 3.49L (96%), FVC 4.98L (112%), TLC 6.22 (104%), no gas trapping, bronchodilator challenge not done, DLCO 89%      Review of Systems   Constitutional:  Negative for chills and fever.   HENT:  Positive for congestion, postnasal drip, rhinorrhea and sinus pressure. Negative for trouble swallowing and voice change.         +ear plugging   Respiratory:  Positive for cough, shortness of breath and wheezing.    Cardiovascular:  Negative for chest pain and leg swelling.   Musculoskeletal:  Negative for myalgias.   Skin:  Positive for rash (dermatographia).   Allergic/Immunologic: Positive for environmental allergies.   Psychiatric/Behavioral:  Negative for sleep disturbance.        Objective   Physical Exam  Constitutional:       General: She is not in acute distress.     Appearance: Normal appearance. She is not toxic-appearing.   HENT:      Head: Normocephalic and atraumatic.      Nose: Congestion present.      Mouth/Throat:      Mouth: Mucous membranes are moist.      Pharynx: Posterior oropharyngeal erythema present. No oropharyngeal exudate.      Comments: +clear post nasal drainage  Eyes:      General: No scleral icterus.  Cardiovascular:      Rate and Rhythm: Normal rate and regular rhythm.      Heart sounds: No murmur heard.     No friction rub. No gallop.   Pulmonary:      Effort: Pulmonary effort is normal.      Breath sounds: Normal breath sounds. No wheezing, rhonchi or rales.   Abdominal:      General: There is no distension.   Musculoskeletal:      Right lower leg: No edema.      Left lower leg: No edema.   Skin:     General: Skin is warm and dry.      Findings: No rash.   Neurological:      General: No focal deficit present.      Mental Status: She is alert and oriented  to person, place, and time.   Psychiatric:         Mood and Affect: Mood normal.         Behavior: Behavior normal.         Thought Content: Thought content normal.         Judgment: Judgment normal.         Assessment/Plan   Ms. Greenfield is a 35yoF current smoker (1/2ppd since age 15) with history of GERD, ADHD, Hodgkin's lymphoma s/p treatment in 2014 c/b neuropathy (in remission), anxiety, and chronic dyspnea presenting for evaluation of abnormal PFTs.    PFTs show mild obstruction (no bronchodilator challenge done) and her symptoms do sound consistent with asthma with environmental triggers. Less likely COPD.     Diagnoses and all orders for this visit:  Seasonal allergic rhinitis due to pollen  -Continue Xyzal and flonase, advised to add azelastine if needed  -     Respiratory Allergy Profile IgE; Future  Mild persistent asthma uncomplicated  -Initiate maintenance ICS/LABA and PRN Airsupra  -     Referral to Pulmonology  -     fluticasone furoate-vilanteroL (Breo Ellipta) 100-25 mcg/dose inhaler; Inhale 1 puff once daily.  -     albuterol-budesonide (Airsupra) 90-80 mcg/actuation inhaler; Inhale 2 puffs every 6 hours if needed (shortness of breath, cough, wheezing).  -     Respiratory Allergy Profile IgE; Future  Dyspnea on exertion  -     Referral to Pulmonology  Tobacco use disorder  -Discussed cessation, she is weaning down on her cigarettes. Prefers no nicotine replacement products at this time. Discussed Chantix but she prefers to hold off given her difficulties with her anxiety disorder at this time       Dorothy Barrios,  04/21/25 9:14 AM

## 2025-04-21 NOTE — PATIENT INSTRUCTIONS
Please start Breo one puff once daily. Please rinse your mouth out afterward (swish and spit) to avoid thrush. Use this inhaler regardless of your symptoms. Please let me know if the copay is cost prohibitive and we will switch to another inhaler  Please use AirSupra two puffs every 6 hours as needed for shortness of breath, coughing and wheezing. Rinse your mouth after use  Please get your blood drawn at your earliest convenience  Follow up in 3 months

## 2025-04-22 LAB
A ALTERNATA IGE QN: <0.1 KU/L
A ALTERNATA IGE RAST: 0
A FUMIGATUS IGE QN: 0.29 KU/L
A FUMIGATUS IGE RAST: ABNORMAL
BERMUDA GRASS IGE QN: <0.1 KU/L
BERMUDA GRASS IGE RAST: 0
BOXELDER IGE QN: <0.1 KU/L
BOXELDER IGE RAST: 0
C HERBARUM IGE QN: <0.1 KU/L
C HERBARUM IGE RAST: 0
CALIF WALNUT POLN IGE QN: <0.1 KU/L
CALIF WALNUT POLN IGE RAST: 0
CAT DANDER IGE QN: <0.1 KU/L
CAT DANDER IGE RAST: 0
CMN PIGWEED IGE QN: <0.1 KU/L
CMN PIGWEED IGE RAST: 0
COMMON RAGWEED IGE QN: <0.1 KU/L
COMMON RAGWEED IGE RAST: 0
COTTONWOOD IGE QN: <0.1 KU/L
COTTONWOOD IGE RAST: 0
D FARINAE IGE QN: 0.14 KU/L
D FARINAE IGE RAST: ABNORMAL
D PTERONYSS IGE QN: 0.21 KU/L
D PTERONYSS IGE RAST: ABNORMAL
DOG DANDER IGE QN: <0.1 KU/L
DOG DANDER IGE RAST: 0
IGE SERPL-ACNC: 160 KU/L
LONDON PLANE IGE QN: <0.1 KU/L
LONDON PLANE IGE RAST: 0
MOUSE URINE PROT IGE QN: <0.1 KU/L
MOUSE URINE PROT IGE RAST: 0
MT JUNIPER IGE QN: <0.1 KU/L
MT JUNIPER IGE RAST: 0
P NOTATUM IGE QN: <0.1 KU/L
P NOTATUM IGE RAST: 0
PECAN/HICK TREE IGE QN: <0.1 KU/L
PECAN/HICK TREE IGE RAST: 0
REF LAB TEST REF RANGE: NORMAL
ROACH IGE QN: <0.1 KU/L
ROACH IGE RAST: 0
SALTWORT IGE QN: <0.1 KU/L
SALTWORT IGE RAST: 0
SHEEP SORREL IGE QN: <0.1 KU/L
SHEEP SORREL IGE RAST: 0
SILVER BIRCH IGE QN: <0.1 KU/L
SILVER BIRCH IGE RAST: 0
TIMOTHY IGE QN: 0.11 KU/L
TIMOTHY IGE RAST: ABNORMAL
WHITE ASH IGE QN: <0.1 KU/L
WHITE ASH IGE RAST: 0
WHITE ELM IGE QN: <0.1 KU/L
WHITE ELM IGE RAST: 0
WHITE MULBERRY IGE QN: <0.1 KU/L
WHITE MULBERRY IGE RAST: 0
WHITE OAK IGE QN: <0.1 KU/L
WHITE OAK IGE RAST: 0

## 2025-04-23 ENCOUNTER — OFFICE VISIT (OUTPATIENT)
Dept: VASCULAR SURGERY | Facility: HOSPITAL | Age: 35
End: 2025-04-23
Payer: COMMERCIAL

## 2025-04-23 VITALS
WEIGHT: 176 LBS | BODY MASS INDEX: 25.25 KG/M2 | HEART RATE: 86 BPM | SYSTOLIC BLOOD PRESSURE: 123 MMHG | DIASTOLIC BLOOD PRESSURE: 86 MMHG

## 2025-04-23 DIAGNOSIS — R29.898 HEAVY SENSATION OF LOWER EXTREMITY: ICD-10-CM

## 2025-04-23 DIAGNOSIS — I87.2 VENOUS INSUFFICIENCY: Primary | ICD-10-CM

## 2025-04-23 PROCEDURE — 99204 OFFICE O/P NEW MOD 45 MIN: CPT | Performed by: PHYSICIAN ASSISTANT

## 2025-04-23 PROCEDURE — 99214 OFFICE O/P EST MOD 30 MIN: CPT | Performed by: PHYSICIAN ASSISTANT

## 2025-04-23 ASSESSMENT — ENCOUNTER SYMPTOMS
ABDOMINAL PAIN: 0
TROUBLE SWALLOWING: 0
FEVER: 0
ADENOPATHY: 0
DIARRHEA: 0
NAUSEA: 0
ARTHRALGIAS: 0
DIZZINESS: 0
WEAKNESS: 0
NECK PAIN: 0
FACIAL ASYMMETRY: 0
SORE THROAT: 0
SLEEP DISTURBANCE: 0
HEADACHES: 0
WHEEZING: 0
LIGHT-HEADEDNESS: 0
NUMBNESS: 0
CHILLS: 0
JOINT SWELLING: 0
COLOR CHANGE: 0
PALPITATIONS: 0
CONSTIPATION: 0
COUGH: 0
SPEECH DIFFICULTY: 0
CONFUSION: 0
WOUND: 0
VOMITING: 0
SEIZURES: 0
SHORTNESS OF BREATH: 0
DIFFICULTY URINATING: 0
FATIGUE: 0

## 2025-04-23 NOTE — PROGRESS NOTES
Subjective   Patient ID: Nelda Greenfield is a 35 y.o. female who presents for New Patient Visit (NPV- Venous insufficiency/Ref from [facility] ).  HPI  35 year old female with past medical history of GERD, IBS, hodkin lymphoma s/p chemo, anxiety, ADHD, neuropathy 2/2 chemo, fatigue presents as a new patient for evaluation of venous insufficiency. Complains of bilateral lower extremity swelling L>R. Worse at the end of the day or with prolonged standing or sitting. Had previous vascular testing and workup in [place] in 2022 was dx with venous insufficiency, never follow up since that time. No claudication or rest pain. No wounds or ulcerations. No history of DVT. Family history of Venous insufficiency in mother.     Medical History[1]   Surgical History[2]   Social History     Socioeconomic History    Marital status:      Spouse name: Not on file    Number of children: Not on file    Years of education: Not on file    Highest education level: Not on file   Occupational History    Not on file   Tobacco Use    Smoking status: Every Day     Current packs/day: 0.50     Types: Cigarettes    Smokeless tobacco: Never   Vaping Use    Vaping status: Never Used   Substance and Sexual Activity    Alcohol use: Yes     Alcohol/week: 3.0 - 4.0 standard drinks of alcohol     Types: 3 - 4 Standard drinks or equivalent per week    Drug use: Never    Sexual activity: Not on file   Other Topics Concern    Not on file   Social History Narrative    Not on file     Social Drivers of Health     Financial Resource Strain: Not on file   Food Insecurity: Not on file   Transportation Needs: Not on file   Physical Activity: Not on file   Stress: Not on file   Social Connections: Not on file   Intimate Partner Violence: Not on file   Housing Stability: Not on file    Family History[3]   RX Allergies[4]   Current Outpatient Medications   Medication Instructions    albuterol-budesonide (Airsupra) 90-80 mcg/actuation inhaler 2 puffs,  inhalation, Every 6 hours PRN    fluticasone furoate-vilanteroL (Breo Ellipta) 100-25 mcg/dose inhaler 1 puff, inhalation, Daily    gabapentin (NEURONTIN) 300 mg, oral, 3 times daily    levocetirizine (XYZAL) 5 mg, oral, Every evening    LORazepam (ATIVAN) 0.5 mg, oral, Daily PRN    pantoprazole (PROTONIX) 40 mg, oral, Daily before breakfast, Do not crush, chew, or split.    propranolol (INDERAL) 40 mg, oral, 2 times daily      Review of Systems   Constitutional:  Negative for chills, fatigue and fever.   HENT:  Negative for congestion, sore throat and trouble swallowing.    Eyes:  Negative for visual disturbance.   Respiratory:  Negative for cough, shortness of breath and wheezing.    Cardiovascular:  Positive for leg swelling. Negative for chest pain and palpitations.   Gastrointestinal:  Negative for abdominal pain, constipation, diarrhea, nausea and vomiting.   Endocrine: Negative for cold intolerance and heat intolerance.   Genitourinary:  Negative for difficulty urinating.   Musculoskeletal:  Negative for arthralgias, joint swelling and neck pain.   Skin:  Negative for color change and wound.   Neurological:  Negative for dizziness, seizures, syncope, facial asymmetry, speech difficulty, weakness, light-headedness, numbness and headaches.   Hematological:  Negative for adenopathy.   Psychiatric/Behavioral:  Negative for behavioral problems, confusion and sleep disturbance.      Vitals:    04/23/25 1420   BP: 123/86   Pulse: 86   Weight: 79.8 kg (176 lb)      Objective   Physical Exam  Constitutional:       Appearance: Normal appearance.   HENT:      Head: Normocephalic.      Right Ear: External ear normal.      Left Ear: External ear normal.      Nose: Nose normal.      Mouth/Throat:      Mouth: Mucous membranes are moist.   Eyes:      Extraocular Movements: Extraocular movements intact.   Neck:      Vascular: No carotid bruit.   Cardiovascular:      Rate and Rhythm: Normal rate and regular rhythm.       Pulses: Normal pulses.      Comments: Bilateral lower extremities warm to the toes, motor and sensory intact. Palpable pedal pulses. No obvious VV bilaterally. No venous stasis changes. No wounds or ulcerations.   Pulmonary:      Effort: Pulmonary effort is normal. No respiratory distress.      Breath sounds: Normal breath sounds.   Abdominal:      Palpations: Abdomen is soft.      Tenderness: There is no abdominal tenderness.   Musculoskeletal:         General: No swelling or tenderness.      Cervical back: Normal range of motion and neck supple.      Right lower leg: Edema present.      Left lower leg: Edema present.   Skin:     General: Skin is warm and dry.      Capillary Refill: Capillary refill takes less than 2 seconds.      Findings: No lesion.   Neurological:      General: No focal deficit present.      Mental Status: She is alert and oriented to person, place, and time. Mental status is at baseline.   Psychiatric:         Mood and Affect: Mood normal.         Behavior: Behavior normal.         Thought Content: Thought content normal.         Judgment: Judgment normal.         Assessment/Plan   Problem List Items Addressed This Visit           ICD-10-CM    Venous insufficiency - Primary I87.2    Relevant Orders    Vascular US Lower Extremity Venous Insufficiency Bilateral     Other Visit Diagnoses         Codes      Heavy sensation of lower extremity     R29.898    Relevant Orders    Vascular US Lower Extremity Venous Insufficiency Bilateral        35 year old female with past medical history of GERD, IBS, hodkin lymphoma s/p chemo, anxiety, ADHD, neuropathy 2/2 chemo, fatigue presents as a new patient for evaluation of venous insufficiency.   -Discussed pathophysiology of deep and superficial venous insufficiency  -Will obtain VI study, will call with results, (+/-) CT venogram discussed with patient   -Recommend compression and elevation  -Continue ambulation as tolerated  -Discussed above course of care  and treatment plan with patient who is in agreement, all questions answered  -Patient to call with new or worsening symptoms    -further recommendations pending testing       Ethel Vásquez PA-C 04/23/25 4:28 PM        [1]   Past Medical History:  Diagnosis Date    Personal history of other diseases of the respiratory system     History of allergic rhinitis    Personal history of other mental and behavioral disorders     History of anxiety    Personal history of other mental and behavioral disorders 02/16/2021    History of anxiety   [2]   Past Surgical History:  Procedure Laterality Date    OTHER SURGICAL HISTORY  09/27/2019    [place] tooth extraction    OTHER SURGICAL HISTORY  09/27/2019    Surgery    OTHER SURGICAL HISTORY  12/15/2022    Endoscopy    OTHER SURGICAL HISTORY  02/16/2021    Colonoscopy   [3]   Family History  Problem Relation Name Age of Onset    Leukemia Maternal Grandmother     [4]   Allergies  Allergen Reactions    Codeine Other, Hives, Rash and Swelling

## 2025-05-02 DIAGNOSIS — F41.9 ANXIETY: ICD-10-CM

## 2025-05-02 DIAGNOSIS — R00.2 HEART PALPITATIONS: ICD-10-CM

## 2025-05-02 RX ORDER — PROPRANOLOL HYDROCHLORIDE 40 MG/1
40 TABLET ORAL 2 TIMES DAILY
Qty: 180 TABLET | Refills: 1 | OUTPATIENT
Start: 2025-05-02

## 2025-05-02 RX ORDER — PROPRANOLOL HYDROCHLORIDE 40 MG/1
40 TABLET ORAL 2 TIMES DAILY
Qty: 180 TABLET | Refills: 0 | Status: SHIPPED | OUTPATIENT
Start: 2025-05-02

## 2025-05-15 ENCOUNTER — OFFICE VISIT (OUTPATIENT)
Dept: PRIMARY CARE | Facility: CLINIC | Age: 35
End: 2025-05-15
Payer: COMMERCIAL

## 2025-05-15 VITALS
BODY MASS INDEX: 24.91 KG/M2 | HEART RATE: 86 BPM | TEMPERATURE: 98.4 F | HEIGHT: 70 IN | SYSTOLIC BLOOD PRESSURE: 142 MMHG | OXYGEN SATURATION: 98 % | DIASTOLIC BLOOD PRESSURE: 80 MMHG | WEIGHT: 174 LBS

## 2025-05-15 DIAGNOSIS — R07.89 FEELING OF CHEST TIGHTNESS: ICD-10-CM

## 2025-05-15 DIAGNOSIS — R10.10 PAIN OF UPPER ABDOMEN: Primary | ICD-10-CM

## 2025-05-15 PROCEDURE — 99214 OFFICE O/P EST MOD 30 MIN: CPT | Performed by: NURSE PRACTITIONER

## 2025-05-15 PROCEDURE — 3008F BODY MASS INDEX DOCD: CPT | Performed by: NURSE PRACTITIONER

## 2025-05-15 PROCEDURE — 93000 ELECTROCARDIOGRAM COMPLETE: CPT | Performed by: NURSE PRACTITIONER

## 2025-05-15 ASSESSMENT — ENCOUNTER SYMPTOMS
APPETITE CHANGE: 0
ANAL BLEEDING: 0
MUSCULOSKELETAL NEGATIVE: 1
BLOOD IN STOOL: 0
DIAPHORESIS: 1
ABDOMINAL DISTENTION: 0
SHORTNESS OF BREATH: 1
FEVER: 0
VOMITING: 0
ABDOMINAL PAIN: 1
NEUROLOGICAL NEGATIVE: 1
DIARRHEA: 1
COUGH: 0
CONSTIPATION: 0
RECTAL PAIN: 0
CHEST TIGHTNESS: 1
NAUSEA: 1

## 2025-05-15 NOTE — PROGRESS NOTES
"Subjective   Patient ID: Nelda Greenfield is a 35 y.o. female who presents for Abdominal Pain (Pt stated she's been having severe stomach pain(Feels more like nerve pain) x 2 days and it's now preventing her from sleeping. Stomach is sensitive and painful to the touch. Even having her bra on is bothering it. She hasn't eaten today incase labs needed completed. ).    HPI   Patient here for abdominal pain. Last office visit on 04/07/2025  Current concern:  1) abdominal pain -2 days, feels like nerve pain - shooting pain from right side of abdomen and sensitive and painful to touch on bilateral upper abdomen, chest pain \"feeling choppy with breathing\".  Abdomen feels hot to her touch,  pain even wearing a bra. Has not eaten today. Yesterday she was able to eat (normal diet for her).  BM have been loose for a month. Denies suzy blood or melena, slight nausea but no vomiting, continues to eat normal diet    Abdominal pain 10/10  this morning called Physician on call (Dr Virk)  recommended Pantoprazole 2nd dose, patient also had chewable TUMs, pantoprazole seemed to help. TUMS caused increased nausea. Having tightness in chest as well as staggered breathing pattern.   Hx of gastric ulcers and IBS   Chronic concerns: ADHD, Anxiety, IBS, GERD,  Peripheral neuropathy d/t chemotherapy, vitamin D deficiency, Seasonal Allergies, abdominal cramping, Pruritius  Specialist  - Gastroenterologist- has appt on 05/21/2025 with Dr Montgomery.   - Allergist Dr Pham  - GYN- yearly  Labs Through ER 09/30/2024  SMOKER- 1/2 PPD- since approximately age 16 yo.   Caffeine- not much.      Review of Systems   Constitutional:  Positive for diaphoresis. Negative for appetite change and fever.   Respiratory:  Positive for chest tightness and shortness of breath. Negative for cough.    Cardiovascular:  Positive for chest pain.   Gastrointestinal:  Positive for abdominal pain, diarrhea (loose stool) and nausea. Negative for abdominal distention " "(feels swollen under rib cage), anal bleeding, blood in stool, constipation, rectal pain and vomiting.   Genitourinary:         LMP 3 weeks ago   Musculoskeletal: Negative.    Skin: Negative.    Neurological: Negative.      Objective   /80 (BP Location: Right arm, Patient Position: Sitting, BP Cuff Size: Adult)   Pulse 86   Temp 36.9 °C (98.4 °F) (Oral)   Ht 1.778 m (5' 10\")   Wt 78.9 kg (174 lb)   SpO2 98%   BMI 24.97 kg/m²     Physical Exam  Vitals reviewed.   Constitutional:       General: She is not in acute distress.     Appearance: She is normal weight. She is diaphoretic. She is not ill-appearing.   Cardiovascular:      Rate and Rhythm: Normal rate and regular rhythm.      Heart sounds: Normal heart sounds.   Pulmonary:      Effort: Pulmonary effort is normal.      Breath sounds: Normal breath sounds. No decreased breath sounds (genealized), wheezing, rhonchi or rales.   Abdominal:      General: Bowel sounds are normal.      Palpations: Abdomen is soft.      Tenderness: There is abdominal tenderness in the right upper quadrant. There is no guarding or rebound. Positive signs include Nugent's sign. Negative signs include Rovsing's sign.       Musculoskeletal:      Cervical back: Neck supple.      Right lower leg: No edema.      Left lower leg: No edema.   Skin:     General: Skin is warm.      Findings: No rash.   Neurological:      General: No focal deficit present.      Mental Status: She is alert.      Gait: Gait is intact.   Psychiatric:         Attention and Perception: Attention normal.         Behavior: Behavior normal. Behavior is cooperative.       Assessment/Plan   Diagnoses and all orders for this visit:  Pain of upper abdomen   Tylenol as directed on label for pain, bland diet with plenty of fluids  Pantoprazole take one twice daily   Red flags- severe abdominal pain, fever, vomiting, coughing up blood, passing blood in stool, chest pain, difficulty breathing -> GO TO ER   -     Lipase; " Future  -     CBC and Auto Differential; Future  -     Comprehensive Metabolic Panel; Future  -     Gamma GT; Future  -     Amylase; Future  -     Urinalysis with Reflex Culture and Microscopic; Future  -     CT abdomen w IV contrast; Future  Feeling of chest tightness  -     XR chest 2 views; Future  -     ECG 12 lead (Clinic Performed)  Sinus rhythm, normal ECG     PLAN: Follow up pending lab/imaging results if needed  Patient is scheduled with GI next week Dr Montgomery  Otherwise follow up as directed

## 2025-05-15 NOTE — PATIENT INSTRUCTIONS
Tylenol as directed on label for pain, bland diet with plenty of fluids  Pantoprazole take one twice daily     EKG today   Order of Ct with contrast abdomen  Chest x-ray  Keep appt with GI next week.     Red flags- severe abdominal pain, fever, vomiting, coughing up blood, passing blood in stool, chest pain, difficulty breathing -> GO TO ER

## 2025-05-16 ENCOUNTER — HOSPITAL ENCOUNTER (OUTPATIENT)
Dept: RADIOLOGY | Facility: CLINIC | Age: 35
Discharge: HOME | End: 2025-05-16
Payer: COMMERCIAL

## 2025-05-16 DIAGNOSIS — R07.89 FEELING OF CHEST TIGHTNESS: ICD-10-CM

## 2025-05-16 LAB
ALBUMIN SERPL-MCNC: 5.2 G/DL (ref 3.6–5.1)
ALP SERPL-CCNC: 55 U/L (ref 31–125)
ALT SERPL-CCNC: 16 U/L (ref 6–29)
AMYLASE SERPL-CCNC: 21 U/L (ref 21–101)
ANION GAP SERPL CALCULATED.4IONS-SCNC: 14 MMOL/L (CALC) (ref 7–17)
APPEARANCE UR: CLEAR
AST SERPL-CCNC: 19 U/L (ref 10–30)
BACTERIA #/AREA URNS HPF: ABNORMAL /HPF
BACTERIA UR CULT: ABNORMAL
BASOPHILS # BLD AUTO: 32 CELLS/UL (ref 0–200)
BASOPHILS NFR BLD AUTO: 0.6 %
BILIRUB SERPL-MCNC: 1 MG/DL (ref 0.2–1.2)
BILIRUB UR QL STRIP: NEGATIVE
BUN SERPL-MCNC: 12 MG/DL (ref 7–25)
CALCIUM SERPL-MCNC: 9.6 MG/DL (ref 8.6–10.2)
CHLORIDE SERPL-SCNC: 104 MMOL/L (ref 98–110)
CO2 SERPL-SCNC: 20 MMOL/L (ref 20–32)
COLOR UR: YELLOW
CREAT SERPL-MCNC: 0.76 MG/DL (ref 0.5–0.97)
EGFRCR SERPLBLD CKD-EPI 2021: 103 ML/MIN/1.73M2
EOSINOPHIL # BLD AUTO: 239 CELLS/UL (ref 15–500)
EOSINOPHIL NFR BLD AUTO: 4.5 %
ERYTHROCYTE [DISTWIDTH] IN BLOOD BY AUTOMATED COUNT: 13.7 % (ref 11–15)
GGT SERPL-CCNC: 12 U/L (ref 3–50)
GLUCOSE SERPL-MCNC: 85 MG/DL (ref 65–139)
GLUCOSE UR QL STRIP: NEGATIVE
HCT VFR BLD AUTO: 44.7 % (ref 35–45)
HGB BLD-MCNC: 14.5 G/DL (ref 11.7–15.5)
HGB UR QL STRIP: NEGATIVE
HYALINE CASTS #/AREA URNS LPF: ABNORMAL /LPF
KETONES UR QL STRIP: ABNORMAL
LEUKOCYTE ESTERASE UR QL STRIP: NEGATIVE
LIPASE SERPL-CCNC: 14 U/L (ref 7–60)
LYMPHOCYTES # BLD AUTO: 1972 CELLS/UL (ref 850–3900)
LYMPHOCYTES NFR BLD AUTO: 37.2 %
MCH RBC QN AUTO: 29.8 PG (ref 27–33)
MCHC RBC AUTO-ENTMCNC: 32.4 G/DL (ref 32–36)
MCV RBC AUTO: 92 FL (ref 80–100)
MONOCYTES # BLD AUTO: 472 CELLS/UL (ref 200–950)
MONOCYTES NFR BLD AUTO: 8.9 %
NEUTROPHILS # BLD AUTO: 2586 CELLS/UL (ref 1500–7800)
NEUTROPHILS NFR BLD AUTO: 48.8 %
NITRITE UR QL STRIP: NEGATIVE
PH UR STRIP: 7 [PH] (ref 5–8)
PLATELET # BLD AUTO: 202 THOUSAND/UL (ref 140–400)
PMV BLD REES-ECKER: 12 FL (ref 7.5–12.5)
POTASSIUM SERPL-SCNC: 4.1 MMOL/L (ref 3.5–5.3)
PROT SERPL-MCNC: 8.1 G/DL (ref 6.1–8.1)
PROT UR QL STRIP: NEGATIVE
RBC # BLD AUTO: 4.86 MILLION/UL (ref 3.8–5.1)
RBC #/AREA URNS HPF: ABNORMAL /HPF
SERVICE CMNT-IMP: ABNORMAL
SODIUM SERPL-SCNC: 138 MMOL/L (ref 135–146)
SP GR UR STRIP: 1.02 (ref 1–1.03)
SQUAMOUS #/AREA URNS HPF: ABNORMAL /HPF
WBC # BLD AUTO: 5.3 THOUSAND/UL (ref 3.8–10.8)
WBC #/AREA URNS HPF: ABNORMAL /HPF

## 2025-05-16 PROCEDURE — 71046 X-RAY EXAM CHEST 2 VIEWS: CPT

## 2025-05-19 DIAGNOSIS — F41.0 PANIC ATTACK: ICD-10-CM

## 2025-05-19 DIAGNOSIS — F40.01 FEAR OF TRAVEL WITH PANIC ATTACKS: ICD-10-CM

## 2025-05-19 RX ORDER — LORAZEPAM 0.5 MG/1
0.5 TABLET ORAL DAILY PRN
Qty: 5 TABLET | Refills: 0 | Status: SHIPPED | OUTPATIENT
Start: 2025-05-19 | End: 2025-05-28

## 2025-05-20 NOTE — H&P (VIEW-ONLY)
[facility]    ASSESSMENT and PLAN:       Nelda Greenfield is a 35 y.o. female with a significant past medical history of Hodgkin's lymphoma, ADHD, IBS-d, anxiety, and GERD who presents for consultation requested by her primary care provider (ALEKSANDER Vang) for the evaluation of abdominal pain.       GERD and abdominal pain  History of GERD and previous esophagitis with esophageal ulcer. Current symptoms are less consistent with GERD and some of her descriptions of symptoms could be non-GI in nature. Exam is concerning for a MSK or abdominal wall source. With her history will continue her twice daily PPI and plan for EGD.  - CT pending  - continue Pantoprazole BID  - EGD scheduled in endo      IBS-d  Longstanding symptoms consistent with IBS and previous work up has been unremarkable. Recently some change in symptoms. Will repeat labs. Could also consider constipation with overflow and will review CT for degree of stool burden.          Follow up in 3 months.        Ney Montgomery MD        Senior Attending Physician, Gastroenterology    [facility]    Clinical   [employer]        Subjective   HISTORY OF PRESENT ILLNESS:     Chief Complaint  Hiatal Hernia    History Of Present Illness:    Nelda Greenfield is a 35 y.o. female with a significant past medical history of Hodgkin's lymphoma, ADHD, IBS-d, anxiety, and GERD who presents for consultation requested by her primary care provider (ALEKSANDER Vang) for the evaluation of abdominal pain.     She was seen by her PCP on 5/15/2025 and reported two days of abdominal pain. Labs and a CT were ordered and she was referred to GI. Labs showed a normal CBC, unremarkable CMP, normal lipase, normal amylase, and normal UA. CT has not been done.    She has previously followed with GI (ALEKSANDER Hawk) for IBS-d and  was last seen in 2022. At that time a course of Rifaximin was recommended and she was continued on Omeprazole. She had reported being followed by GI in [place] for IBS and previously trying Amitriptyline. Labs have shown a normal CRP, negative celiac panel, normal elastase, and normal calprotectin.      She says that she has had issues with intermittent abdominal pain in the past as well as heartburn and diarrhea. For the last week she has had an increase in abdominal pain. She denies any clear trigger prior to these symptoms starting including no recent illnesses, no change in medications, and no injury. She describes a sharp pain that is located in the lower chest with radiation to both ribs as well as pain located in the epigastric area that radiates to the back. She has a stretching sensation with this pain and also describes a shooting/nerve pain that is worse with deep breaths. She hasn't noticed any particular triggers, but it is worse when she is laying down and particularly when she is laying on her back. No relationship to eating. This has been accompanied by nausea, but no vomiting. She has started taking Pantoprazole twice a day for the last week and not noticed any significant change with that medication. She has had some sour taste in her mouth. She has been having 4-5 small BMs per day that are loose/watery. She is worried that these symptoms might be related to a hiatal hernia or to the previous esophageal ulcer that she had.      Patient denies any dysphagia, odynophagia, constipation, hematemesis, hematochezia, melena, or weight loss.    Endoscopy History:  9/9/2020 - Colonoscopy: normal TI, normal colon, random biopsies obtained (normal mucosa on path)  12/19/2022 - EGD: LA grade B esophagitis, esophageal ulcer, erosive gastritis      Review of systems:   Review of Systems   Constitutional:  Negative for chills, fatigue, fever and unexpected weight change.   HENT:  Negative for congestion, sneezing,  sore throat, trouble swallowing and voice change.    Respiratory:  Positive for shortness of breath. Negative for cough and wheezing.    Cardiovascular:  Positive for palpitations. Negative for chest pain and leg swelling.   Gastrointestinal:         As detailed above.   Genitourinary:  Negative for dysuria and hematuria.   Musculoskeletal:  Negative for arthralgias and myalgias.   Skin:  Negative for pallor and rash.   Neurological:  Negative for dizziness, seizures, syncope, weakness, numbness and headaches.   Hematological:  Negative for adenopathy. Does not bruise/bleed easily.   Psychiatric/Behavioral:  Negative for confusion. The patient is nervous/anxious.    All other systems reviewed and are negative.      I performed a complete 10 point review of systems and it is negative except as noted in HPI or above.      PAST HISTORIES:       Past Medical History:  Problem List[1]  She has a past medical history of GERD (gastroesophageal reflux disease) (2023), History of transfusion (2014), Personal history of other diseases of the respiratory system, Personal history of other mental and behavioral disorders, Personal history of other mental and behavioral disorders (02/16/2021), Pneumonia (2023), and Venous insufficiency (2022).    Past Surgical History:  She has a past surgical history that includes Other surgical history (09/27/2019); Other surgical history (09/27/2019); Other surgical history (12/15/2022); and Other surgical history (02/16/2021).      Social History:  She reports that she has been smoking cigarettes. She has a 7.5 pack-year smoking history. She has never used smokeless tobacco. She reports that she does not currently use alcohol after a past usage of about 2.0 standard drinks of alcohol per week. She reports that she does not use drugs.    Family History:  No known family history of GI disease, specifically denies any family history of pancreatitis, Crohn's, colon cancer, gastroesophageal  "cancer, or ulcerative colitis.    Family History[2]     Allergies:  Codeine      Objective   OBJECTIVE:       Last Recorded Vitals:  Vitals:    05/21/25 0924   BP: 129/89   Pulse: 88   SpO2: 98%   Weight: 76.7 kg (169 lb)   Height: 1.778 m (5' 10\")     /89   Pulse 88   Ht 1.778 m (5' 10\")   Wt 76.7 kg (169 lb)   SpO2 98%   BMI 24.25 kg/m²      Physical Exam:    Physical Exam  Vitals reviewed.   Constitutional:       General: She is not in acute distress.     Appearance: She is not ill-appearing.   HENT:      Head: Normocephalic and atraumatic.   Eyes:      General: No scleral icterus.  Cardiovascular:      Rate and Rhythm: Normal rate and regular rhythm.      Pulses: Normal pulses.      Heart sounds: Normal heart sounds. No murmur heard.  Pulmonary:      Effort: Pulmonary effort is normal. No respiratory distress.      Breath sounds: Normal breath sounds. No wheezing.   Abdominal:      General: Bowel sounds are normal.      Palpations: Abdomen is soft.      Tenderness: There is abdominal tenderness. There is no rebound.      Comments: +Carnett's sign   Musculoskeletal:         General: No swelling or deformity.   Skin:     General: Skin is warm and dry.      Coloration: Skin is not jaundiced.      Findings: No rash.   Neurological:      General: No focal deficit present.      Mental Status: She is alert and oriented to person, place, and time.   Psychiatric:         Mood and Affect: Mood normal.         Behavior: Behavior normal.         Thought Content: Thought content normal.         Judgment: Judgment normal.         Home Medications:  Prior to Admission medications    Medication Sig Start Date End Date Taking? Authorizing Provider   albuterol-budesonide (Airsupra) 90-80 mcg/actuation inhaler Inhale 2 puffs every 6 hours if needed (shortness of breath, cough, wheezing). 4/21/25   Dorothy Barrios, DO   fluticasone furoate-vilanteroL (Breo Ellipta) 100-25 mcg/dose inhaler Inhale 1 puff once daily. 4/21/25   " Dorothy Barrios, DO   gabapentin (Neurontin) 300 mg capsule Take 1 capsule (300 mg) by mouth 3 times a day. 11/5/24   ALEKSANDER Vang   levocetirizine (Xyzal) 5 mg tablet Take 1 tablet (5 mg) by mouth once daily in the evening. 6/4/24   ALEKSANDER Vang   LORazepam (Ativan) 0.5 mg tablet Take 1 tablet (0.5 mg) by mouth once daily as needed for anxiety for up to 5 days. 5/19/25 5/24/25  ALEKSANDER Vang   pantoprazole (ProtoNix) 40 mg EC tablet TAKE 1 TABLET BY MOUTH ONCE DAILY IN THE MORNING.TAKE BEFORE MEALS. DO NOT CRUSH, CHEW, OR SPLIT. 5/5/25   ALEKSANDER Vang   propranolol (Inderal) 40 mg tablet Take 1 tablet (40 mg) by mouth 2 times a day. 5/2/25   ALEKSANDER Vang   LORazepam (Ativan) 0.5 mg tablet Take 1 tablet (0.5 mg) by mouth once daily as needed for anxiety (pain attack with travel) for up to 7 days. 3/28/25 5/19/25  ALEKSANDER Vang         Relevant Results Recent labs reviewed in the EMR.    Lab Results   Component Value Date/Time    WBC 5.3 05/15/2025 1405    HGB 14.5 05/15/2025 1405    HGB 15.4 09/30/2024 1339    HGB 14.2 02/22/2023 0853    HGB 14.4 03/16/2021 0901    HGB 13.8 04/06/2020 1228    MCV 92.0 05/15/2025 1405     05/15/2025 1405     09/30/2024 1339     02/22/2023 0853     03/16/2021 0901     04/06/2020 1228       Lab Results   Component Value Date/Time     05/15/2025 1405    K 4.1 05/15/2025 1405     05/15/2025 1405    BUN 12 05/15/2025 1405    CREATININE 0.76 05/15/2025 1405    CREATININE 0.88 09/30/2024 1339    CREATININE 0.83 02/22/2023 0853    CREATININE 0.80 03/16/2021 0901       Lab Results   Component Value Date/Time    BILITOT 1.0 05/15/2025 1405    BILITOT 0.9 09/30/2024 1339    BILITOT 0.7 02/22/2023 0853    BILITOT 0.5 03/16/2021 0901    BILITOT 0.6 04/06/2020 1228    ALKPHOS 55 05/15/2025 1405    ALKPHOS 65 09/30/2024 1339    ALKPHOS 51 02/22/2023 0853    ALKPHOS 49 03/16/2021 0901     ALKPHOS 45 04/06/2020 1228    AST 19 05/15/2025 1405    AST 15 09/30/2024 1339    AST 16 02/22/2023 0853    AST 15 06/18/2021 0910    AST 17 03/16/2021 0901    ALT 16 05/15/2025 1405    ALT 15 09/30/2024 1339    ALT 17 02/22/2023 0853    ALT 16 06/18/2021 0910    ALT 18 03/16/2021 0901    LIPASE 14 05/15/2025 1405       Lab Results   Component Value Date/Time    CRP 0.11 02/22/2023 0853    CRP <0.10 06/24/2020 1010    CALPS <16 06/30/2020 0900       Radiology: Imaging reviewed in the EMR.             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

## 2025-05-20 NOTE — PROGRESS NOTES
[facility]    ASSESSMENT and PLAN:       Nelda Greenfield is a 35 y.o. female with a significant past medical history of Hodgkin's lymphoma, ADHD, IBS-d, anxiety, and GERD who presents for consultation requested by her primary care provider (ALEKSANDER Vang) for the evaluation of abdominal pain.       GERD and abdominal pain  History of GERD and previous esophagitis with esophageal ulcer. Current symptoms are less consistent with GERD and some of her descriptions of symptoms could be non-GI in nature. Exam is concerning for a MSK or abdominal wall source. With her history will continue her twice daily PPI and plan for EGD.  - CT pending  - continue Pantoprazole BID  - EGD scheduled in endo      IBS-d  Longstanding symptoms consistent with IBS and previous work up has been unremarkable. Recently some change in symptoms. Will repeat labs. Could also consider constipation with overflow and will review CT for degree of stool burden.          Follow up in 3 months.        Ney Montgomery MD        Senior Attending Physician, Gastroenterology    [facility]    Clinical   [employer]        Subjective   HISTORY OF PRESENT ILLNESS:     Chief Complaint  Hiatal Hernia    History Of Present Illness:    Nelda Greenfield is a 35 y.o. female with a significant past medical history of Hodgkin's lymphoma, ADHD, IBS-d, anxiety, and GERD who presents for consultation requested by her primary care provider (ALEKSANDER Vang) for the evaluation of abdominal pain.     She was seen by her PCP on 5/15/2025 and reported two days of abdominal pain. Labs and a CT were ordered and she was referred to GI. Labs showed a normal CBC, unremarkable CMP, normal lipase, normal amylase, and normal UA. CT has not been done.    She has previously followed with GI (ALEKSANDER Hawk) for IBS-d and  was last seen in 2022. At that time a course of Rifaximin was recommended and she was continued on Omeprazole. She had reported being followed by GI in [place] for IBS and previously trying Amitriptyline. Labs have shown a normal CRP, negative celiac panel, normal elastase, and normal calprotectin.      She says that she has had issues with intermittent abdominal pain in the past as well as heartburn and diarrhea. For the last week she has had an increase in abdominal pain. She denies any clear trigger prior to these symptoms starting including no recent illnesses, no change in medications, and no injury. She describes a sharp pain that is located in the lower chest with radiation to both ribs as well as pain located in the epigastric area that radiates to the back. She has a stretching sensation with this pain and also describes a shooting/nerve pain that is worse with deep breaths. She hasn't noticed any particular triggers, but it is worse when she is laying down and particularly when she is laying on her back. No relationship to eating. This has been accompanied by nausea, but no vomiting. She has started taking Pantoprazole twice a day for the last week and not noticed any significant change with that medication. She has had some sour taste in her mouth. She has been having 4-5 small BMs per day that are loose/watery. She is worried that these symptoms might be related to a hiatal hernia or to the previous esophageal ulcer that she had.      Patient denies any dysphagia, odynophagia, constipation, hematemesis, hematochezia, melena, or weight loss.    Endoscopy History:  9/9/2020 - Colonoscopy: normal TI, normal colon, random biopsies obtained (normal mucosa on path)  12/19/2022 - EGD: LA grade B esophagitis, esophageal ulcer, erosive gastritis      Review of systems:   Review of Systems   Constitutional:  Negative for chills, fatigue, fever and unexpected weight change.   HENT:  Negative for congestion, sneezing,  sore throat, trouble swallowing and voice change.    Respiratory:  Positive for shortness of breath. Negative for cough and wheezing.    Cardiovascular:  Positive for palpitations. Negative for chest pain and leg swelling.   Gastrointestinal:         As detailed above.   Genitourinary:  Negative for dysuria and hematuria.   Musculoskeletal:  Negative for arthralgias and myalgias.   Skin:  Negative for pallor and rash.   Neurological:  Negative for dizziness, seizures, syncope, weakness, numbness and headaches.   Hematological:  Negative for adenopathy. Does not bruise/bleed easily.   Psychiatric/Behavioral:  Negative for confusion. The patient is nervous/anxious.    All other systems reviewed and are negative.      I performed a complete 10 point review of systems and it is negative except as noted in HPI or above.      PAST HISTORIES:       Past Medical History:  Problem List[1]  She has a past medical history of GERD (gastroesophageal reflux disease) (2023), History of transfusion (2014), Personal history of other diseases of the respiratory system, Personal history of other mental and behavioral disorders, Personal history of other mental and behavioral disorders (02/16/2021), Pneumonia (2023), and Venous insufficiency (2022).    Past Surgical History:  She has a past surgical history that includes Other surgical history (09/27/2019); Other surgical history (09/27/2019); Other surgical history (12/15/2022); and Other surgical history (02/16/2021).      Social History:  She reports that she has been smoking cigarettes. She has a 7.5 pack-year smoking history. She has never used smokeless tobacco. She reports that she does not currently use alcohol after a past usage of about 2.0 standard drinks of alcohol per week. She reports that she does not use drugs.    Family History:  No known family history of GI disease, specifically denies any family history of pancreatitis, Crohn's, colon cancer, gastroesophageal  "cancer, or ulcerative colitis.    Family History[2]     Allergies:  Codeine      Objective   OBJECTIVE:       Last Recorded Vitals:  Vitals:    05/21/25 0924   BP: 129/89   Pulse: 88   SpO2: 98%   Weight: 76.7 kg (169 lb)   Height: 1.778 m (5' 10\")     /89   Pulse 88   Ht 1.778 m (5' 10\")   Wt 76.7 kg (169 lb)   SpO2 98%   BMI 24.25 kg/m²      Physical Exam:    Physical Exam  Vitals reviewed.   Constitutional:       General: She is not in acute distress.     Appearance: She is not ill-appearing.   HENT:      Head: Normocephalic and atraumatic.   Eyes:      General: No scleral icterus.  Cardiovascular:      Rate and Rhythm: Normal rate and regular rhythm.      Pulses: Normal pulses.      Heart sounds: Normal heart sounds. No murmur heard.  Pulmonary:      Effort: Pulmonary effort is normal. No respiratory distress.      Breath sounds: Normal breath sounds. No wheezing.   Abdominal:      General: Bowel sounds are normal.      Palpations: Abdomen is soft.      Tenderness: There is abdominal tenderness. There is no rebound.      Comments: +Carnett's sign   Musculoskeletal:         General: No swelling or deformity.   Skin:     General: Skin is warm and dry.      Coloration: Skin is not jaundiced.      Findings: No rash.   Neurological:      General: No focal deficit present.      Mental Status: She is alert and oriented to person, place, and time.   Psychiatric:         Mood and Affect: Mood normal.         Behavior: Behavior normal.         Thought Content: Thought content normal.         Judgment: Judgment normal.         Home Medications:  Prior to Admission medications    Medication Sig Start Date End Date Taking? Authorizing Provider   albuterol-budesonide (Airsupra) 90-80 mcg/actuation inhaler Inhale 2 puffs every 6 hours if needed (shortness of breath, cough, wheezing). 4/21/25   Dorothy Barrios, DO   fluticasone furoate-vilanteroL (Breo Ellipta) 100-25 mcg/dose inhaler Inhale 1 puff once daily. 4/21/25   " Dorothy Barrios, DO   gabapentin (Neurontin) 300 mg capsule Take 1 capsule (300 mg) by mouth 3 times a day. 11/5/24   ALEKSANDER Vang   levocetirizine (Xyzal) 5 mg tablet Take 1 tablet (5 mg) by mouth once daily in the evening. 6/4/24   ALEKSANDER Vang   LORazepam (Ativan) 0.5 mg tablet Take 1 tablet (0.5 mg) by mouth once daily as needed for anxiety for up to 5 days. 5/19/25 5/24/25  ALEKSANDER Vang   pantoprazole (ProtoNix) 40 mg EC tablet TAKE 1 TABLET BY MOUTH ONCE DAILY IN THE MORNING.TAKE BEFORE MEALS. DO NOT CRUSH, CHEW, OR SPLIT. 5/5/25   ALEKSANDER Vang   propranolol (Inderal) 40 mg tablet Take 1 tablet (40 mg) by mouth 2 times a day. 5/2/25   ALEKSANDER Vang   LORazepam (Ativan) 0.5 mg tablet Take 1 tablet (0.5 mg) by mouth once daily as needed for anxiety (pain attack with travel) for up to 7 days. 3/28/25 5/19/25  ALEKSANDER Vang         Relevant Results Recent labs reviewed in the EMR.    Lab Results   Component Value Date/Time    WBC 5.3 05/15/2025 1405    HGB 14.5 05/15/2025 1405    HGB 15.4 09/30/2024 1339    HGB 14.2 02/22/2023 0853    HGB 14.4 03/16/2021 0901    HGB 13.8 04/06/2020 1228    MCV 92.0 05/15/2025 1405     05/15/2025 1405     09/30/2024 1339     02/22/2023 0853     03/16/2021 0901     04/06/2020 1228       Lab Results   Component Value Date/Time     05/15/2025 1405    K 4.1 05/15/2025 1405     05/15/2025 1405    BUN 12 05/15/2025 1405    CREATININE 0.76 05/15/2025 1405    CREATININE 0.88 09/30/2024 1339    CREATININE 0.83 02/22/2023 0853    CREATININE 0.80 03/16/2021 0901       Lab Results   Component Value Date/Time    BILITOT 1.0 05/15/2025 1405    BILITOT 0.9 09/30/2024 1339    BILITOT 0.7 02/22/2023 0853    BILITOT 0.5 03/16/2021 0901    BILITOT 0.6 04/06/2020 1228    ALKPHOS 55 05/15/2025 1405    ALKPHOS 65 09/30/2024 1339    ALKPHOS 51 02/22/2023 0853    ALKPHOS 49 03/16/2021 0901     ALKPHOS 45 04/06/2020 1228    AST 19 05/15/2025 1405    AST 15 09/30/2024 1339    AST 16 02/22/2023 0853    AST 15 06/18/2021 0910    AST 17 03/16/2021 0901    ALT 16 05/15/2025 1405    ALT 15 09/30/2024 1339    ALT 17 02/22/2023 0853    ALT 16 06/18/2021 0910    ALT 18 03/16/2021 0901    LIPASE 14 05/15/2025 1405       Lab Results   Component Value Date/Time    CRP 0.11 02/22/2023 0853    CRP <0.10 06/24/2020 1010    CALPS <16 06/30/2020 0900       Radiology: Imaging reviewed in the EMR.             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

## 2025-05-21 ENCOUNTER — APPOINTMENT (OUTPATIENT)
Facility: CLINIC | Age: 35
End: 2025-05-21
Payer: COMMERCIAL

## 2025-05-21 VITALS
HEIGHT: 70 IN | DIASTOLIC BLOOD PRESSURE: 89 MMHG | HEART RATE: 88 BPM | SYSTOLIC BLOOD PRESSURE: 129 MMHG | OXYGEN SATURATION: 98 % | BODY MASS INDEX: 24.2 KG/M2 | WEIGHT: 169 LBS

## 2025-05-21 DIAGNOSIS — K21.00 GASTROESOPHAGEAL REFLUX DISEASE WITH ESOPHAGITIS WITHOUT HEMORRHAGE: ICD-10-CM

## 2025-05-21 DIAGNOSIS — R10.9 ABDOMINAL PAIN, UNSPECIFIED ABDOMINAL LOCATION: Primary | ICD-10-CM

## 2025-05-21 DIAGNOSIS — R19.7 DIARRHEA, UNSPECIFIED TYPE: ICD-10-CM

## 2025-05-21 PROBLEM — I99.8 VASCULAR INSUFFICIENCY: Status: ACTIVE | Noted: 2023-05-30

## 2025-05-21 PROBLEM — J30.9 ALLERGIC RHINITIS: Status: ACTIVE | Noted: 2025-05-21

## 2025-05-21 PROCEDURE — 3008F BODY MASS INDEX DOCD: CPT | Performed by: INTERNAL MEDICINE

## 2025-05-21 PROCEDURE — 99204 OFFICE O/P NEW MOD 45 MIN: CPT | Performed by: INTERNAL MEDICINE

## 2025-05-21 ASSESSMENT — ENCOUNTER SYMPTOMS
TROUBLE SWALLOWING: 0
PALPITATIONS: 1
ADENOPATHY: 0
SHORTNESS OF BREATH: 1
COUGH: 0
DIZZINESS: 0
CHILLS: 0
ROS GI COMMENTS: AS DETAILED ABOVE.
VOICE CHANGE: 0
MYALGIAS: 0
SORE THROAT: 0
UNEXPECTED WEIGHT CHANGE: 0
CONFUSION: 0
FATIGUE: 0
NERVOUS/ANXIOUS: 1
NUMBNESS: 0
DYSURIA: 0
WHEEZING: 0
ARTHRALGIAS: 0
HEMATURIA: 0
BRUISES/BLEEDS EASILY: 0
HEADACHES: 0
WEAKNESS: 0
SEIZURES: 0
FEVER: 0

## 2025-05-21 NOTE — LETTER
May 21, 2025     Shreya Quinones, APRN-CNP  [address]  [facility], [address]  [place] [address]    Patient: Nelda Greenfield   YOB: 1990   Date of Visit: 5/21/2025       Dear Dr. Shreya Quinones, APRN-CNP:    Thank you for referring Nelda Greenfield to me for evaluation. Below are my notes for this consultation.  If you have questions, please do not hesitate to call me. I look forward to following your patient along with you.       Sincerely,     Ney Montgomery MD      CC: No Recipients  ______________________________________________________________________________________        [facility]    ASSESSMENT and PLAN:       Nelda Greenfield is a 35 y.o. female with a significant past medical history of Hodgkin's lymphoma, ADHD, IBS-d, anxiety, and GERD who presents for consultation requested by her primary care provider (RUBIN Vang-CNP) for the evaluation of abdominal pain.       GERD and abdominal pain  History of GERD and previous esophagitis with esophageal ulcer. Current symptoms are less consistent with GERD and some of her descriptions of symptoms could be non-GI in nature. Exam is concerning for a MSK or abdominal wall source. With her history will continue her twice daily PPI and plan for EGD.  - CT pending  - continue Pantoprazole BID  - EGD scheduled in endo      IBS-d  Longstanding symptoms consistent with IBS and previous work up has been unremarkable. Recently some change in symptoms. Will repeat labs. Could also consider constipation with overflow and will review CT for degree of stool burden.          Follow up in 3 months.        Ney Montgomery MD        Senior Attending Physician, Gastroenterology    [facility]    Clinical   [employer]        Subjective  HISTORY OF PRESENT ILLNESS:     Chief Complaint  Hiatal Hernia    History Of  Present Illness:    Nelda Greenfield is a 35 y.o. female with a significant past medical history of Hodgkin's lymphoma, ADHD, IBS-d, anxiety, and GERD who presents for consultation requested by her primary care provider (ALEKSANDER Vang) for the evaluation of abdominal pain.     She was seen by her PCP on 5/15/2025 and reported two days of abdominal pain. Labs and a CT were ordered and she was referred to GI. Labs showed a normal CBC, unremarkable CMP, normal lipase, normal amylase, and normal UA. CT has not been done.    She has previously followed with GI (ALEKSANDER Hawk) for IBS-d and was last seen in 2022. At that time a course of Rifaximin was recommended and she was continued on Omeprazole. She had reported being followed by GI in [place] for IBS and previously trying Amitriptyline. Labs have shown a normal CRP, negative celiac panel, normal elastase, and normal calprotectin.      She says that she has had issues with intermittent abdominal pain in the past as well as heartburn and diarrhea. For the last week she has had an increase in abdominal pain. She denies any clear trigger prior to these symptoms starting including no recent illnesses, no change in medications, and no injury. She describes a sharp pain that is located in the lower chest with radiation to both ribs as well as pain located in the epigastric area that radiates to the back. She has a stretching sensation with this pain and also describes a shooting/nerve pain that is worse with deep breaths. She hasn't noticed any particular triggers, but it is worse when she is laying down and particularly when she is laying on her back. No relationship to eating. This has been accompanied by nausea, but no vomiting. She has started taking Pantoprazole twice a day for the last week and not noticed any significant change with that medication. She has had some sour taste in her mouth. She has been having 4-5 small BMs per day that are  loose/watery. She is worried that these symptoms might be related to a hiatal hernia or to the previous esophageal ulcer that she had.      Patient denies any dysphagia, odynophagia, constipation, hematemesis, hematochezia, melena, or weight loss.    Endoscopy History:  9/9/2020 - Colonoscopy: normal TI, normal colon, random biopsies obtained (normal mucosa on path)  12/19/2022 - EGD: LA grade B esophagitis, esophageal ulcer, erosive gastritis      Review of systems:   Review of Systems   Constitutional:  Negative for chills, fatigue, fever and unexpected weight change.   HENT:  Negative for congestion, sneezing, sore throat, trouble swallowing and voice change.    Respiratory:  Positive for shortness of breath. Negative for cough and wheezing.    Cardiovascular:  Positive for palpitations. Negative for chest pain and leg swelling.   Gastrointestinal:         As detailed above.   Genitourinary:  Negative for dysuria and hematuria.   Musculoskeletal:  Negative for arthralgias and myalgias.   Skin:  Negative for pallor and rash.   Neurological:  Negative for dizziness, seizures, syncope, weakness, numbness and headaches.   Hematological:  Negative for adenopathy. Does not bruise/bleed easily.   Psychiatric/Behavioral:  Negative for confusion. The patient is nervous/anxious.    All other systems reviewed and are negative.      I performed a complete 10 point review of systems and it is negative except as noted in HPI or above.      PAST HISTORIES:       Past Medical History:  Problem List[1]  She has a past medical history of GERD (gastroesophageal reflux disease) (2023), History of transfusion (2014), Personal history of other diseases of the respiratory system, Personal history of other mental and behavioral disorders, Personal history of other mental and behavioral disorders (02/16/2021), Pneumonia (2023), and Venous insufficiency (2022).    Past Surgical History:  She has a past surgical history that includes Other  "surgical history (09/27/2019); Other surgical history (09/27/2019); Other surgical history (12/15/2022); and Other surgical history (02/16/2021).      Social History:  She reports that she has been smoking cigarettes. She has a 7.5 pack-year smoking history. She has never used smokeless tobacco. She reports that she does not currently use alcohol after a past usage of about 2.0 standard drinks of alcohol per week. She reports that she does not use drugs.    Family History:  No known family history of GI disease, specifically denies any family history of pancreatitis, Crohn's, colon cancer, gastroesophageal cancer, or ulcerative colitis.    Family History[2]     Allergies:  Codeine      Objective  OBJECTIVE:       Last Recorded Vitals:  Vitals:    05/21/25 0924   BP: 129/89   Pulse: 88   SpO2: 98%   Weight: 76.7 kg (169 lb)   Height: 1.778 m (5' 10\")     /89   Pulse 88   Ht 1.778 m (5' 10\")   Wt 76.7 kg (169 lb)   SpO2 98%   BMI 24.25 kg/m²      Physical Exam:    Physical Exam  Vitals reviewed.   Constitutional:       General: She is not in acute distress.     Appearance: She is not ill-appearing.   HENT:      Head: Normocephalic and atraumatic.   Eyes:      General: No scleral icterus.  Cardiovascular:      Rate and Rhythm: Normal rate and regular rhythm.      Pulses: Normal pulses.      Heart sounds: Normal heart sounds. No murmur heard.  Pulmonary:      Effort: Pulmonary effort is normal. No respiratory distress.      Breath sounds: Normal breath sounds. No wheezing.   Abdominal:      General: Bowel sounds are normal.      Palpations: Abdomen is soft.      Tenderness: There is abdominal tenderness. There is no rebound.      Comments: +Carnett's sign   Musculoskeletal:         General: No swelling or deformity.   Skin:     General: Skin is warm and dry.      Coloration: Skin is not jaundiced.      Findings: No rash.   Neurological:      General: No focal deficit present.      Mental Status: She is alert " and oriented to person, place, and time.   Psychiatric:         Mood and Affect: Mood normal.         Behavior: Behavior normal.         Thought Content: Thought content normal.         Judgment: Judgment normal.         Home Medications:  Prior to Admission medications    Medication Sig Start Date End Date Taking? Authorizing Provider   albuterol-budesonide (Airsupra) 90-80 mcg/actuation inhaler Inhale 2 puffs every 6 hours if needed (shortness of breath, cough, wheezing). 4/21/25   Dorothy Barrios DO   fluticasone furoate-vilanteroL (Breo Ellipta) 100-25 mcg/dose inhaler Inhale 1 puff once daily. 4/21/25   Dorothy Barrios DO   gabapentin (Neurontin) 300 mg capsule Take 1 capsule (300 mg) by mouth 3 times a day. 11/5/24   ALEKSANDER Vang   levocetirizine (Xyzal) 5 mg tablet Take 1 tablet (5 mg) by mouth once daily in the evening. 6/4/24   ALEKSANDER Vang   LORazepam (Ativan) 0.5 mg tablet Take 1 tablet (0.5 mg) by mouth once daily as needed for anxiety for up to 5 days. 5/19/25 5/24/25  ALEKSANDER Vang   pantoprazole (ProtoNix) 40 mg EC tablet TAKE 1 TABLET BY MOUTH ONCE DAILY IN THE MORNING.TAKE BEFORE MEALS. DO NOT CRUSH, CHEW, OR SPLIT. 5/5/25   ALEKSANDER Vang   propranolol (Inderal) 40 mg tablet Take 1 tablet (40 mg) by mouth 2 times a day. 5/2/25   ALEKSANDER Vang   LORazepam (Ativan) 0.5 mg tablet Take 1 tablet (0.5 mg) by mouth once daily as needed for anxiety (pain attack with travel) for up to 7 days. 3/28/25 5/19/25  ALEKSANDER Vang         Relevant Results Recent labs reviewed in the EMR.    Lab Results   Component Value Date/Time    WBC 5.3 05/15/2025 1405    HGB 14.5 05/15/2025 1405    HGB 15.4 09/30/2024 1339    HGB 14.2 02/22/2023 0853    HGB 14.4 03/16/2021 0901    HGB 13.8 04/06/2020 1228    MCV 92.0 05/15/2025 1405     05/15/2025 1405     09/30/2024 1339     02/22/2023 0853     03/16/2021 0901     04/06/2020 1228        Lab Results   Component Value Date/Time     05/15/2025 1405    K 4.1 05/15/2025 1405     05/15/2025 1405    BUN 12 05/15/2025 1405    CREATININE 0.76 05/15/2025 1405    CREATININE 0.88 09/30/2024 1339    CREATININE 0.83 02/22/2023 0853    CREATININE 0.80 03/16/2021 0901       Lab Results   Component Value Date/Time    BILITOT 1.0 05/15/2025 1405    BILITOT 0.9 09/30/2024 1339    BILITOT 0.7 02/22/2023 0853    BILITOT 0.5 03/16/2021 0901    BILITOT 0.6 04/06/2020 1228    ALKPHOS 55 05/15/2025 1405    ALKPHOS 65 09/30/2024 1339    ALKPHOS 51 02/22/2023 0853    ALKPHOS 49 03/16/2021 0901    ALKPHOS 45 04/06/2020 1228    AST 19 05/15/2025 1405    AST 15 09/30/2024 1339    AST 16 02/22/2023 0853    AST 15 06/18/2021 0910    AST 17 03/16/2021 0901    ALT 16 05/15/2025 1405    ALT 15 09/30/2024 1339    ALT 17 02/22/2023 0853    ALT 16 06/18/2021 0910    ALT 18 03/16/2021 0901    LIPASE 14 05/15/2025 1405       Lab Results   Component Value Date/Time    CRP 0.11 02/22/2023 0853    CRP <0.10 06/24/2020 1010    CALPS <16 06/30/2020 0900       Radiology: Imaging reviewed in the EMR.             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)   • Nicotine dependence   • Pain of right scapula   • Onychomycosis of right great toe   • Rash   • Seasonal allergies   • Pruritus   • Shakiness   • Tremor   • Spontaneous vaginal delivery ([facility])   • Swelling of eyelid   • Vascular insufficiency   • Vitamin D deficiency   • Allergic rhinitis   [2]  Family History  Problem Relation Name Age of Onset   • Leukemia Maternal  Grandmother     • Cancer Maternal Grandfather Tigist    • Breast cancer Paternal Grandmother Umm         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

## 2025-05-21 NOTE — PATIENT INSTRUCTIONS
Check labs.      I am prescribing you a medication to block acid in your stomach.  You should take this medication every day.  Take it first thing in the morning about 30 minutes before breakfast.  If you have been prescribed this medicine twice daily you should take the second dose about 30 minutes before dinner.        You have been scheduled for an upper endoscopy (EGD).  You were given instructions for preparing for this test in the office today.  If you have questions about these instructions, please call my office at [phone].    After your procedure, you can expect me to talk to you to go over the results of the procedure.    You were also given information regarding the schedule for your procedure including the time that you need to arrive to the endoscopy unit.  You will also be contacted 2-3 day prior to your procedure to confirm the final arrival time.  If you have questions about this or if you need to cancel or change this appointment please call my office at [phone].       Follow up in 3 months.

## 2025-05-22 LAB — CRP SERPL-MCNC: <3 MG/L

## 2025-05-23 LAB
C COLI+JEJUNI+LARI FUSA STL QL NAA+PROBE: NOT DETECTED
C DIFF TOX GENS STL QL NAA+PROBE: NOT DETECTED
CALPROTECTIN STL-MCNT: NORMAL UG/G
EC STX1 GENE STL QL NAA+PROBE: NOT DETECTED
EC STX2 GENE STL QL NAA+PROBE: NOT DETECTED
ELASTASE PANC STL-MCNT: NORMAL UG/G
NOROV GI+II ORF1-ORF2 JNC STL QL NAA+PR: NOT DETECTED
RVA NSP5 STL QL NAA+PROBE: NOT DETECTED
SALMONELLA SP RPOD STL QL NAA+PROBE: NOT DETECTED
SHIGELLA DNA SPEC QL NAA+PROBE: NOT DETECTED
V CHOL+PARA RFBL+TRKH+TNAA STL QL NAA+PR: NOT DETECTED
Y ENTERO RECN STL QL NAA+PROBE: NOT DETECTED

## 2025-05-27 ENCOUNTER — PREP FOR PROCEDURE (OUTPATIENT)
Facility: CLINIC | Age: 35
End: 2025-05-27
Payer: COMMERCIAL

## 2025-05-27 RX ORDER — SODIUM CHLORIDE 9 MG/ML
20 INJECTION, SOLUTION INTRAVENOUS CONTINUOUS
Status: CANCELLED | OUTPATIENT
Start: 2025-05-27 | End: 2025-05-27

## 2025-05-28 ENCOUNTER — HOSPITAL ENCOUNTER (OUTPATIENT)
Dept: GASTROENTEROLOGY | Facility: HOSPITAL | Age: 35
Discharge: HOME | End: 2025-05-28
Payer: COMMERCIAL

## 2025-05-28 ENCOUNTER — ANESTHESIA EVENT (OUTPATIENT)
Dept: GASTROENTEROLOGY | Facility: HOSPITAL | Age: 35
End: 2025-05-28
Payer: COMMERCIAL

## 2025-05-28 ENCOUNTER — ANESTHESIA (OUTPATIENT)
Dept: GASTROENTEROLOGY | Facility: HOSPITAL | Age: 35
End: 2025-05-28
Payer: COMMERCIAL

## 2025-05-28 VITALS
SYSTOLIC BLOOD PRESSURE: 124 MMHG | RESPIRATION RATE: 16 BRPM | TEMPERATURE: 97 F | DIASTOLIC BLOOD PRESSURE: 62 MMHG | OXYGEN SATURATION: 100 % | HEART RATE: 68 BPM

## 2025-05-28 DIAGNOSIS — R10.9 ABDOMINAL PAIN, UNSPECIFIED ABDOMINAL LOCATION: ICD-10-CM

## 2025-05-28 DIAGNOSIS — K21.00 GASTROESOPHAGEAL REFLUX DISEASE WITH ESOPHAGITIS WITHOUT HEMORRHAGE: ICD-10-CM

## 2025-05-28 PROBLEM — J45.909 ASTHMA: Status: ACTIVE | Noted: 2025-05-28

## 2025-05-28 PROCEDURE — 3700000002 HC GENERAL ANESTHESIA TIME - EACH INCREMENTAL 1 MINUTE

## 2025-05-28 PROCEDURE — 7100000009 HC PHASE TWO TIME - INITIAL BASE CHARGE

## 2025-05-28 PROCEDURE — 2500000004 HC RX 250 GENERAL PHARMACY W/ HCPCS (ALT 636 FOR OP/ED): Performed by: INTERNAL MEDICINE

## 2025-05-28 PROCEDURE — 7100000010 HC PHASE TWO TIME - EACH INCREMENTAL 1 MINUTE

## 2025-05-28 PROCEDURE — 3700000001 HC GENERAL ANESTHESIA TIME - INITIAL BASE CHARGE

## 2025-05-28 PROCEDURE — 43239 EGD BIOPSY SINGLE/MULTIPLE: CPT | Performed by: INTERNAL MEDICINE

## 2025-05-28 PROCEDURE — 2500000004 HC RX 250 GENERAL PHARMACY W/ HCPCS (ALT 636 FOR OP/ED): Mod: JW | Performed by: NURSE ANESTHETIST, CERTIFIED REGISTERED

## 2025-05-28 RX ORDER — LIDOCAINE HYDROCHLORIDE 20 MG/ML
INJECTION, SOLUTION INFILTRATION; PERINEURAL AS NEEDED
Status: DISCONTINUED | OUTPATIENT
Start: 2025-05-28 | End: 2025-05-28

## 2025-05-28 RX ORDER — FENTANYL CITRATE 50 UG/ML
INJECTION, SOLUTION INTRAMUSCULAR; INTRAVENOUS AS NEEDED
Status: DISCONTINUED | OUTPATIENT
Start: 2025-05-28 | End: 2025-05-28

## 2025-05-28 RX ORDER — PROPOFOL 10 MG/ML
INJECTION, EMULSION INTRAVENOUS AS NEEDED
Status: DISCONTINUED | OUTPATIENT
Start: 2025-05-28 | End: 2025-05-28

## 2025-05-28 RX ORDER — SODIUM CHLORIDE 9 MG/ML
20 INJECTION, SOLUTION INTRAVENOUS CONTINUOUS
Status: ACTIVE | OUTPATIENT
Start: 2025-05-28 | End: 2025-05-28

## 2025-05-28 RX ADMIN — PROPOFOL 100 MG: 10 INJECTION, EMULSION INTRAVENOUS at 10:09

## 2025-05-28 RX ADMIN — FENTANYL CITRATE 50 MCG: 50 INJECTION INTRAMUSCULAR; INTRAVENOUS at 10:09

## 2025-05-28 RX ADMIN — SODIUM CHLORIDE: 9 INJECTION, SOLUTION INTRAVENOUS at 09:45

## 2025-05-28 RX ADMIN — LIDOCAINE HYDROCHLORIDE 2 ML: 20 INJECTION, SOLUTION INFILTRATION; PERINEURAL at 10:09

## 2025-05-28 SDOH — HEALTH STABILITY: MENTAL HEALTH: CURRENT SMOKER: 0

## 2025-05-28 ASSESSMENT — PAIN - FUNCTIONAL ASSESSMENT
PAIN_FUNCTIONAL_ASSESSMENT: 0-10

## 2025-05-28 ASSESSMENT — PAIN SCALES - GENERAL
PAINLEVEL_OUTOF10: 0 - NO PAIN
PAINLEVEL_OUTOF10: 0 - NO PAIN
PAIN_LEVEL: 0
PAINLEVEL_OUTOF10: 0 - NO PAIN

## 2025-05-28 NOTE — ANESTHESIA PREPROCEDURE EVALUATION
Patient: Nelda Greenfield    Procedure Information       Anesthesia Start Date/Time: 05/28/25 0950    Scheduled providers: Ney Montgomery MD    Procedure: EGD    Location:  [facility]            Relevant Problems   Anesthesia (within normal limits)      Cardiac   (+) Pain of right scapula      Pulmonary   (+) Asthma      Neuro   (+) Anxiety      GI   (+) Gastroesophageal reflux disease with esophagitis   (+) Irritable bowel syndrome with diarrhea      /Renal (within normal limits)      Liver (within normal limits)      Endocrine (within normal limits)      Hematology   (+) Hodgkin's disease in remission   (+) Hodgkin's disease, nodular sclerosis (Multi)      HEENT   (+) Seasonal allergies      ID   (+) Onychomycosis of right great toe      Skin   (+) Rash       Clinical information reviewed:   Tobacco  Allergies  Meds   Med Hx  Surg Hx  OB Status  Fam Hx  Soc   Hx        NPO Detail:  NPO/Void Status  Date of Last Liquid: 05/28/25  Time of Last Liquid: 0615  Date of Last Solid: 05/27/25         Physical Exam    Airway  Mallampati: II  TM distance: >3 FB  Neck ROM: full  Mouth opening: 3 or more finger widths  Comments: Nasal ring     Cardiovascular - normal exam  Rhythm: regular     Dental   Comments: Clear braces out     Pulmonary - normal exam   Abdominal - normal exam           Anesthesia Plan    History of general anesthesia?: yes  History of complications of general anesthesia?: no    ASA 2     MAC     The patient is not a current smoker.    intravenous induction   Anesthetic plan and risks discussed with patient.

## 2025-05-28 NOTE — ANESTHESIA POSTPROCEDURE EVALUATION
Patient: Nelda Greenfield    Procedure Summary       Date: 05/28/25 Room / Location: [facility]    Anesthesia Start: 0950 Anesthesia Stop: 1022    Procedure: EGD Diagnosis:       Gastroesophageal reflux disease with esophagitis without hemorrhage      Abdominal pain, unspecified abdominal location    Scheduled Providers: Ney Montgomery MD Responsible Provider: MANDEEP Reis    Anesthesia Type: MAC ASA Status: 2            Anesthesia Type: MAC    Vitals Value Taken Time   /62 05/28/25 10:41   Temp 36.1 °C (97 °F) 05/28/25 10:41   Pulse 68 05/28/25 10:41   Resp 16 05/28/25 10:41   SpO2 100 % 05/28/25 10:41       Anesthesia Post Evaluation    Patient location during evaluation: bedside  Patient participation: complete - patient participated  Level of consciousness: awake and alert  Pain score: 0  Pain management: adequate  Airway patency: patent  Cardiovascular status: stable  Respiratory status: acceptable  Hydration status: acceptable  Postoperative Nausea and Vomiting: none        There were no known notable events for this encounter.

## 2025-05-29 ENCOUNTER — HOSPITAL ENCOUNTER (OUTPATIENT)
Dept: RADIOLOGY | Facility: CLINIC | Age: 35
Discharge: HOME | End: 2025-05-29
Payer: COMMERCIAL

## 2025-05-29 DIAGNOSIS — R10.10 PAIN OF UPPER ABDOMEN: ICD-10-CM

## 2025-05-29 LAB
C DIFF TOX GENS STL QL NAA+PROBE: NOT DETECTED
CALPROTECTIN STL-MCNT: 25 MCG/G
ELASTASE PANC STL-MCNT: >800 MCG/G

## 2025-05-29 PROCEDURE — 74160 CT ABDOMEN W/CONTRAST: CPT | Performed by: STUDENT IN AN ORGANIZED HEALTH CARE EDUCATION/TRAINING PROGRAM

## 2025-05-29 PROCEDURE — 74160 CT ABDOMEN W/CONTRAST: CPT

## 2025-05-29 PROCEDURE — 2550000001 HC RX 255 CONTRASTS: Mod: JZ | Performed by: NURSE PRACTITIONER

## 2025-05-29 RX ADMIN — IOHEXOL 75 ML: 350 INJECTION, SOLUTION INTRAVENOUS at 09:57

## 2025-05-29 NOTE — ADDENDUM NOTE
Encounter addended by: Gaye Medina RN on: 5/29/2025 1:00 PM   Actions taken: Contacts section saved, Flowsheet accepted

## 2025-05-30 ENCOUNTER — HOSPITAL ENCOUNTER (OUTPATIENT)
Dept: VASCULAR MEDICINE | Facility: HOSPITAL | Age: 35
Discharge: HOME | End: 2025-05-30
Payer: COMMERCIAL

## 2025-05-30 DIAGNOSIS — M79.89 OTHER SPECIFIED SOFT TISSUE DISORDERS: ICD-10-CM

## 2025-05-30 DIAGNOSIS — I87.2 VENOUS INSUFFICIENCY: ICD-10-CM

## 2025-05-30 DIAGNOSIS — R29.898 HEAVY SENSATION OF LOWER EXTREMITY: ICD-10-CM

## 2025-05-30 PROCEDURE — 93970 EXTREMITY STUDY: CPT

## 2025-05-30 PROCEDURE — 93970 EXTREMITY STUDY: CPT | Performed by: INTERNAL MEDICINE

## 2025-06-03 ENCOUNTER — HOSPITAL ENCOUNTER (OUTPATIENT)
Dept: RADIOLOGY | Facility: CLINIC | Age: 35
Discharge: HOME | End: 2025-06-03
Payer: COMMERCIAL

## 2025-06-03 ENCOUNTER — APPOINTMENT (OUTPATIENT)
Dept: PRIMARY CARE | Facility: CLINIC | Age: 35
End: 2025-06-03
Payer: COMMERCIAL

## 2025-06-03 VITALS
SYSTOLIC BLOOD PRESSURE: 112 MMHG | OXYGEN SATURATION: 98 % | BODY MASS INDEX: 24.82 KG/M2 | WEIGHT: 173.4 LBS | DIASTOLIC BLOOD PRESSURE: 70 MMHG | HEART RATE: 83 BPM | HEIGHT: 70 IN

## 2025-06-03 DIAGNOSIS — Z13.29 THYROID DISORDER SCREEN: ICD-10-CM

## 2025-06-03 DIAGNOSIS — R00.2 HEART PALPITATIONS: ICD-10-CM

## 2025-06-03 DIAGNOSIS — M54.6 CHRONIC BILATERAL THORACIC BACK PAIN: ICD-10-CM

## 2025-06-03 DIAGNOSIS — Z13.220 SCREENING, LIPID: ICD-10-CM

## 2025-06-03 DIAGNOSIS — F90.2 ADHD (ATTENTION DEFICIT HYPERACTIVITY DISORDER), COMBINED TYPE: Primary | ICD-10-CM

## 2025-06-03 DIAGNOSIS — E55.9 VITAMIN D DEFICIENCY: Primary | ICD-10-CM

## 2025-06-03 DIAGNOSIS — M54.2 PAIN, NECK: ICD-10-CM

## 2025-06-03 DIAGNOSIS — Z00.00 WELLNESS EXAMINATION: ICD-10-CM

## 2025-06-03 DIAGNOSIS — R20.0 RIGHT UPPER EXTREMITY NUMBNESS: ICD-10-CM

## 2025-06-03 DIAGNOSIS — G89.29 CHRONIC BILATERAL LOW BACK PAIN WITHOUT SCIATICA: ICD-10-CM

## 2025-06-03 DIAGNOSIS — R53.83 OTHER FATIGUE: ICD-10-CM

## 2025-06-03 DIAGNOSIS — R73.01 ELEVATED FASTING GLUCOSE: ICD-10-CM

## 2025-06-03 DIAGNOSIS — M54.50 CHRONIC BILATERAL LOW BACK PAIN WITHOUT SCIATICA: ICD-10-CM

## 2025-06-03 DIAGNOSIS — G89.29 CHRONIC BILATERAL THORACIC BACK PAIN: ICD-10-CM

## 2025-06-03 PROCEDURE — 99214 OFFICE O/P EST MOD 30 MIN: CPT | Performed by: NURSE PRACTITIONER

## 2025-06-03 PROCEDURE — 72040 X-RAY EXAM NECK SPINE 2-3 VW: CPT

## 2025-06-03 PROCEDURE — 3008F BODY MASS INDEX DOCD: CPT | Performed by: NURSE PRACTITIONER

## 2025-06-03 PROCEDURE — 72040 X-RAY EXAM NECK SPINE 2-3 VW: CPT | Performed by: RADIOLOGY

## 2025-06-03 RX ORDER — DEXTROAMPHETAMINE SACCHARATE, AMPHETAMINE ASPARTATE, DEXTROAMPHETAMINE SULFATE AND AMPHETAMINE SULFATE 2.5; 2.5; 2.5; 2.5 MG/1; MG/1; MG/1; MG/1
10 TABLET ORAL 2 TIMES DAILY
Qty: 60 TABLET | Refills: 0 | Status: SHIPPED | OUTPATIENT
Start: 2025-06-03

## 2025-06-03 RX ORDER — CYCLOBENZAPRINE HCL 10 MG
10 TABLET ORAL NIGHTLY PRN
Qty: 30 TABLET | Refills: 0 | Status: SHIPPED | OUTPATIENT
Start: 2025-06-03 | End: 2025-08-02

## 2025-06-03 ASSESSMENT — ENCOUNTER SYMPTOMS
NERVOUS/ANXIOUS: 1
ABDOMINAL PAIN: 1
PALPITATIONS: 1
RESPIRATORY NEGATIVE: 1
NEUROLOGICAL NEGATIVE: 1
FATIGUE: 1
NAUSEA: 1

## 2025-06-03 NOTE — PROGRESS NOTES
"Subjective   Patient ID: Nelda Greenfield is a 35 y.o. female who presents for Annual Exam (Yearly /Lov 5/15/25/Labs 5/15/25).    HPI   Patient here for ongoing concerns anxiety, ADHD, abdominal pain, was scheduled as wellness exam- with ongoing issues cannot be completed today. Last office visit on 05/15/2025- ongoing abdominal pain.  Patient had message regarding anxiety panic attack on 05/19/2025 and use of husbands Ativan. Short supply was sent, patient has UDS and CSA on file   Current Concern:  1) anxiety and inability to focus-. Both issues increasing and cause abdominal pain to be ongoing. Recent EGD was unremarkable, previous cardiac monitoring noncontributory.    Chronic concerns: ADHD, Anxiety, asthma,  IBS, GERD,  Peripheral neuropathy d/t chemotherapy, vitamin D deficiency, Seasonal Allergies, abdominal cramping, Pruritius  Specialist  - Pulmonology   - vascular   - Gastroenterologist Dr Montgomery.   - Allergist Dr Pham  - GYN- yearly  Labs 05/15/2025 abdominal issues   SMOKER- 1/2 PPD- since approximately age 14 yo.   Caffeine- not much.      Review of Systems   Constitutional:  Positive for fatigue.   HENT:  Positive for congestion.    Respiratory: Negative.     Cardiovascular:  Positive for palpitations.   Gastrointestinal:  Positive for abdominal pain and nausea.   Genitourinary: Negative.    Neurological: Negative.    Psychiatric/Behavioral:  The patient is nervous/anxious.      Objective   /70   Pulse 83   Ht 1.778 m (5' 10\")   Wt 78.7 kg (173 lb 6.4 oz)   LMP 05/20/2025   SpO2 98%   BMI 24.88 kg/m²   Weight in May 174 lbs     Stimulants:   What is the patient's goal of therapy? Improve focus/decrease anxiety associated with focus   Is this being achieved with current treatment? Re-initiating     Activities of Daily Living:   Is your overall impression that this patient is benefiting (symptom reduction outweighs side effects) from stimulant therapy?  Will review at one month follow " up.     1. Physical Functioning: (to be determined at next follow up)  2. Family Relationship: (to be determined at next follow up)  3. Social Relationship: (to be determined at next follow up)  4. Mood: (to be determined at next follow up)  5. Sleep Patterns: (to be determined at next follow up)  6. Overall Function: (to be determined at next follow up)    I have personally reviewed the OARRs Report. It is part of the electronic medical record. I have considered the risk, abuse, dependence, addiction and diversion. I believe that it is clinically appropriate to prescribe this medication.    Last refill:  - no recent refill of Adderall,   UDS 06/03/2025 (originally completed 01/03/2024- MATTHEW deferring repeat)   CSA  06/03/2025   Initiated Adderall 10 mg bid, patient had previously Adderall ER 20 mg and Adderall 10 mg pm dose if needed, patient requested IR to restart.         Physical Exam  Vitals reviewed.   Pulmonary:      Effort: Pulmonary effort is normal.   Musculoskeletal:         General: Normal range of motion.   Skin:     General: Skin is warm.   Neurological:      General: No focal deficit present.      Mental Status: She is alert.   Psychiatric:         Attention and Perception: Attention normal.         Mood and Affect: Mood is anxious.         Speech: Speech is rapid and pressured (somewhat more than baseline).         Behavior: Behavior is cooperative.       Assessment/Plan   Labs - 02/22/2023 routine   Influenza- 10/08/2024   Prevnar 13/20- Not indicated   Shingrix- Not indicated   Colonoscopy- 09/09/2022  EGD 05/28/2025   Cervical CA screen - GYN  Mammogram not indicated  Holter Event Monitor 10/29/2024-10/31/2024- not concerning arhythmia, predominant rhythm with sinus.    EKG (ER on 09/30/2024) NSR.  CT abdomen 05/30/2025- unremarkable with exception of nonobstructive renal calculi each kidney   Diagnoses and all orders for this visit:  ADHD (attention deficit hyperactivity disorder), combined  type  - initiated   amphetamine-dextroamphetamine (AdderalL) 10 mg tablet; Take 1 tablet (10 mg) by mouth 2 times a day.  Chronic bilateral low back pain without sciatica  / Chronic bilateral thoracic back pain / Pain, neck / Right upper extremity numbness  Advised patient to increase water intake 64 ounces, does have non-obstruction bilateral kidney stones, possibly causing or exacerbating lower back pain   - initiated cyclobenzaprine (Flexeril) 10 mg tablet; Take 1 tablet (10 mg) by mouth as needed at bedtime for muscle spasms.  -     Referral to Physical Therapy; Future  -     XR cervical spine 2-3 views; Future    PLAN: Follow up one month as wellness,  Complete labs for next appt, fasting 10-12 hours, do drink water, will review at next appt.  Review effectiveness of Adderall dose?   PLAN:

## 2025-06-03 NOTE — PATIENT INSTRUCTIONS
Complete labs for next appt, fasting 10-12 hours, do drink water, will review at next appt.  Cervical x-ray  Physical therapy ordered   Re-initiated Adderall, will review effectiveness at next appt   Muscle relaxer  Drink 64 ounces of water

## 2025-06-05 LAB
25(OH)D3+25(OH)D2 SERPL-MCNC: 42 NG/ML (ref 30–100)
ALBUMIN SERPL-MCNC: 4.5 G/DL (ref 3.6–5.1)
ALBUMIN/CREAT UR: ABNORMAL MG/G CREAT
ALP SERPL-CCNC: 61 U/L (ref 31–125)
ALT SERPL-CCNC: 10 U/L (ref 6–29)
ANION GAP SERPL CALCULATED.4IONS-SCNC: 10 MMOL/L (CALC) (ref 7–17)
AST SERPL-CCNC: 12 U/L (ref 10–30)
BASOPHILS # BLD AUTO: 41 CELLS/UL (ref 0–200)
BASOPHILS NFR BLD AUTO: 0.7 %
BILIRUB SERPL-MCNC: 0.5 MG/DL (ref 0.2–1.2)
BUN SERPL-MCNC: 10 MG/DL (ref 7–25)
CALCIUM SERPL-MCNC: 9.3 MG/DL (ref 8.6–10.2)
CHLORIDE SERPL-SCNC: 101 MMOL/L (ref 98–110)
CHOLEST SERPL-MCNC: 178 MG/DL
CHOLEST/HDLC SERPL: 3.3 (CALC)
CO2 SERPL-SCNC: 25 MMOL/L (ref 20–32)
CREAT SERPL-MCNC: 0.81 MG/DL (ref 0.5–0.97)
CREAT UR-MCNC: 15 MG/DL (ref 20–275)
EGFRCR SERPLBLD CKD-EPI 2021: 96 ML/MIN/1.73M2
EOSINOPHIL # BLD AUTO: 490 CELLS/UL (ref 15–500)
EOSINOPHIL NFR BLD AUTO: 8.3 %
ERYTHROCYTE [DISTWIDTH] IN BLOOD BY AUTOMATED COUNT: 13.6 % (ref 11–15)
EST. AVERAGE GLUCOSE BLD GHB EST-MCNC: 111 MG/DL
EST. AVERAGE GLUCOSE BLD GHB EST-SCNC: 6.2 MMOL/L
GLUCOSE SERPL-MCNC: 92 MG/DL (ref 65–99)
HBA1C MFR BLD: 5.5 %
HCT VFR BLD AUTO: 43.7 % (ref 35–45)
HDLC SERPL-MCNC: 54 MG/DL
HGB BLD-MCNC: 13.9 G/DL (ref 11.7–15.5)
IRON SATN MFR SERPL: 16 % (CALC) (ref 16–45)
IRON SERPL-MCNC: 59 MCG/DL (ref 40–190)
LDLC SERPL CALC-MCNC: 100 MG/DL (CALC)
LYMPHOCYTES # BLD AUTO: 1741 CELLS/UL (ref 850–3900)
LYMPHOCYTES NFR BLD AUTO: 29.5 %
MCH RBC QN AUTO: 29.8 PG (ref 27–33)
MCHC RBC AUTO-ENTMCNC: 31.8 G/DL (ref 32–36)
MCV RBC AUTO: 93.8 FL (ref 80–100)
MICROALBUMIN UR-MCNC: <0.2 MG/DL
MONOCYTES # BLD AUTO: 348 CELLS/UL (ref 200–950)
MONOCYTES NFR BLD AUTO: 5.9 %
NEUTROPHILS # BLD AUTO: 3280 CELLS/UL (ref 1500–7800)
NEUTROPHILS NFR BLD AUTO: 55.6 %
NONHDLC SERPL-MCNC: 124 MG/DL (CALC)
PLATELET # BLD AUTO: 231 THOUSAND/UL (ref 140–400)
PMV BLD REES-ECKER: 10.5 FL (ref 7.5–12.5)
POTASSIUM SERPL-SCNC: 4.2 MMOL/L (ref 3.5–5.3)
PROT SERPL-MCNC: 7.4 G/DL (ref 6.1–8.1)
RBC # BLD AUTO: 4.66 MILLION/UL (ref 3.8–5.1)
SODIUM SERPL-SCNC: 136 MMOL/L (ref 135–146)
TIBC SERPL-MCNC: 375 MCG/DL (CALC) (ref 250–450)
TRIGL SERPL-MCNC: 137 MG/DL
TSH SERPL-ACNC: 1.28 MIU/L
VIT B12 SERPL-MCNC: 308 PG/ML (ref 200–1100)
WBC # BLD AUTO: 5.9 THOUSAND/UL (ref 3.8–10.8)

## 2025-06-06 ENCOUNTER — EVALUATION (OUTPATIENT)
Dept: PHYSICAL THERAPY | Facility: HOSPITAL | Age: 35
End: 2025-06-06
Payer: COMMERCIAL

## 2025-06-06 DIAGNOSIS — R20.0 RIGHT UPPER EXTREMITY NUMBNESS: ICD-10-CM

## 2025-06-06 DIAGNOSIS — M54.2 PAIN, NECK: ICD-10-CM

## 2025-06-06 DIAGNOSIS — G89.29 CHRONIC BILATERAL LOW BACK PAIN WITHOUT SCIATICA: ICD-10-CM

## 2025-06-06 DIAGNOSIS — M54.6 CHRONIC BILATERAL THORACIC BACK PAIN: ICD-10-CM

## 2025-06-06 DIAGNOSIS — G89.29 CHRONIC BILATERAL THORACIC BACK PAIN: ICD-10-CM

## 2025-06-06 DIAGNOSIS — M54.50 CHRONIC BILATERAL LOW BACK PAIN WITHOUT SCIATICA: ICD-10-CM

## 2025-06-06 PROCEDURE — 97161 PT EVAL LOW COMPLEX 20 MIN: CPT | Mod: GP | Performed by: PHYSICAL THERAPIST

## 2025-06-06 PROCEDURE — 97110 THERAPEUTIC EXERCISES: CPT | Mod: GP | Performed by: PHYSICAL THERAPIST

## 2025-06-06 NOTE — PROGRESS NOTES
"Physical Therapy    Physical Therapy Evaluation and Treatment      Patient Name: Nelda Greenfield  MRN: [id number]  Today's Date: 6/6/2025    Time Entry:   Time Calculation  Start Time: 0930  Stop Time: 0953  Time Calculation (min): 23 min  PT Evaluation Time Entry  PT Evaluation (Low) Time Entry: 15  PT Therapeutic Procedures Time Entry  Therapeutic Exercise Time Entry: 8    Assessment:  PT Assessment  PT Assessment Results: Decreased strength, Decreased mobility, Decreased range of motion, Decreased endurance  Rehab Prognosis: Good  Evaluation/Treatment Tolerance: Patient tolerated treatment well  Strengths: Ability to acquire knowledge  Assessment Comment: Will need skilled PT services.     Plan:  OP PT Plan  Treatment/Interventions: Cryotherapy, Gait training, Hot pack, Manual therapy, Neuromuscular re-education, Therapeutic activities, Therapeutic exercises  PT Plan: Skilled PT  PT Frequency: 2 times per week  Duration: 3 weeks or as needed  Onset Date: 06/06/25  Rehab Potential: Good  Plan of Care Agreement: Patient    Current Problem:   1. Chronic bilateral low back pain without sciatica  Referral to Physical Therapy    Follow Up In Physical Therapy      2. Chronic bilateral thoracic back pain  Referral to Physical Therapy    Follow Up In Physical Therapy      3. Pain, neck  Referral to Physical Therapy    Follow Up In Physical Therapy      4. Right upper extremity numbness  Referral to Physical Therapy    Follow Up In Physical Therapy        Subjective    Patient is a 35-year-old female with a PMH of lymphoma (chemotherapy 2013), asthma, chronic venous insufficiency, and neuropathy who presents with chronic spinal pain (cervical, thoracic, and lumbar) with R UE numbness with insidious onset. Her LBP has been ongoing for years and is constant. Her right sided cervical/scapular pain has been going on for one year. She also is getting \"random\" numbness and weakness in her B UE and LE during activity several times " per week that lasts 30 minutes. She also has different R UE numbness that impacts her entire R UE that happens several times per week and subsides ten minutes after activity cessation. 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 She is taking cyclobenzaprine now, which has helped her with sleeping. Her LBP and her right sided cervical pain is a 4/10. No current UE or LE symptoms. No current HEP. She has difficulty with standing although walking feels better. She would like to not have pain with forward bending. No MRI. Ready for PT today.     XR cervical spine on 6/3/25 was unremarkable.     Transfer of care to Fco Valdez, PT    Objective   Myotomes:   C4: 4+/5 B  C5: 4+/5 B  C6: 4+/5 B  C7: 4+/5 B  C8: 4+/5 B  T1: 4+/5 B  L2: 4+/5 B  L3: 4+/5 B  L4: 4+/5 B  L5: 4+/5 B  S1: 4+/5 B  S2: 4+/5 B    No tenderness at cervical spine  Mild tenderness at lower lumbar spine  Negative Maia's reflex     Positive SLR on right for R LE numbness  Negative SLR on left  Negative thigh thrust B  Tender at B PSIS     Outcome Measures:  Modified MAXINE 22%    Treatments:  Scapular pinches x 10 HEP  Suboccipital release tennis balls x 1 min HEP  SI correction B with wand 3 x 5 sec HEP  Prone press ups x 10 HEP  Nu step next time  Resisted rows next time  Resisted side step next time  B shoulder extension next time     EDUCATION:  Outpatient Education  Individual(s) Educated: Patient  Education Provided: Anatomy, Home Exercise Program, POC  Risk and Benefits Discussed with Patient/Caregiver/Other: yes  Patient/Caregiver Demonstrated Understanding: yes  Plan of Care Discussed and Agreed Upon: yes  Patient Response to Education: Patient/Caregiver Verbalized Understanding of Information  Education Comment: HEP, POC, anatomy    Goals:  Active       Goals       HEP       Start:  06/06/25    Expected End:  12/31/25       Patient will be independent with HEP to improve self management of symptoms.          Strength        Start:  06/06/25    Expected End:  12/31/25       Patient will improve B UE and LE strength to 5/5 to increase function.          Function       Start:  06/06/25    Expected End:  12/31/25       Patient will improve modified MAXINE by 10% to increase function.

## 2025-06-09 ENCOUNTER — APPOINTMENT (OUTPATIENT)
Dept: PHYSICAL THERAPY | Facility: HOSPITAL | Age: 35
End: 2025-06-09
Payer: COMMERCIAL

## 2025-06-12 LAB
LABORATORY COMMENT REPORT: NORMAL
PATH REPORT.FINAL DX SPEC: NORMAL
PATH REPORT.GROSS SPEC: NORMAL
PATH REPORT.RELEVANT HX SPEC: NORMAL
PATH REPORT.TOTAL CANCER: NORMAL

## 2025-06-13 ENCOUNTER — APPOINTMENT (OUTPATIENT)
Dept: PHYSICAL THERAPY | Facility: HOSPITAL | Age: 35
End: 2025-06-13
Payer: COMMERCIAL

## 2025-06-17 ENCOUNTER — APPOINTMENT (OUTPATIENT)
Dept: PHYSICAL THERAPY | Facility: HOSPITAL | Age: 35
End: 2025-06-17
Payer: COMMERCIAL

## 2025-06-19 ENCOUNTER — APPOINTMENT (OUTPATIENT)
Dept: PHYSICAL THERAPY | Facility: HOSPITAL | Age: 35
End: 2025-06-19
Payer: COMMERCIAL

## 2025-06-25 ENCOUNTER — APPOINTMENT (OUTPATIENT)
Dept: PRIMARY CARE | Facility: CLINIC | Age: 35
End: 2025-06-25
Payer: COMMERCIAL

## 2025-06-25 VITALS
DIASTOLIC BLOOD PRESSURE: 78 MMHG | BODY MASS INDEX: 24.74 KG/M2 | WEIGHT: 172.8 LBS | SYSTOLIC BLOOD PRESSURE: 120 MMHG | HEIGHT: 70 IN | OXYGEN SATURATION: 99 % | HEART RATE: 71 BPM

## 2025-06-25 DIAGNOSIS — K22.10 ULCER OF ESOPHAGUS WITHOUT BLEEDING: ICD-10-CM

## 2025-06-25 DIAGNOSIS — F41.0 PANIC ATTACK: ICD-10-CM

## 2025-06-25 DIAGNOSIS — T45.1X5A PERIPHERAL NEUROPATHY DUE TO CHEMOTHERAPY (MULTI): ICD-10-CM

## 2025-06-25 DIAGNOSIS — G62.0 PERIPHERAL NEUROPATHY DUE TO CHEMOTHERAPY (MULTI): ICD-10-CM

## 2025-06-25 DIAGNOSIS — Z72.0 SMOKING TRYING TO QUIT: ICD-10-CM

## 2025-06-25 DIAGNOSIS — F90.2 ADHD (ATTENTION DEFICIT HYPERACTIVITY DISORDER), COMBINED TYPE: Primary | ICD-10-CM

## 2025-06-25 PROCEDURE — 99214 OFFICE O/P EST MOD 30 MIN: CPT | Performed by: NURSE PRACTITIONER

## 2025-06-25 PROCEDURE — 3008F BODY MASS INDEX DOCD: CPT | Performed by: NURSE PRACTITIONER

## 2025-06-25 RX ORDER — PANTOPRAZOLE SODIUM 40 MG/1
40 TABLET, DELAYED RELEASE ORAL
Qty: 90 TABLET | Refills: 1 | Status: SHIPPED | OUTPATIENT
Start: 2025-06-25

## 2025-06-25 RX ORDER — DEXTROAMPHETAMINE SACCHARATE, AMPHETAMINE ASPARTATE, DEXTROAMPHETAMINE SULFATE AND AMPHETAMINE SULFATE 2.5; 2.5; 2.5; 2.5 MG/1; MG/1; MG/1; MG/1
10 TABLET ORAL 2 TIMES DAILY
Qty: 60 TABLET | Refills: 0 | Status: SHIPPED | OUTPATIENT
Start: 2025-06-25

## 2025-06-25 RX ORDER — LORAZEPAM 0.5 MG/1
0.5 TABLET ORAL DAILY PRN
Qty: 5 TABLET | Refills: 0 | Status: SHIPPED | OUTPATIENT
Start: 2025-06-25 | End: 2025-06-30

## 2025-06-25 RX ORDER — GABAPENTIN 300 MG/1
300 CAPSULE ORAL 3 TIMES DAILY
Qty: 90 CAPSULE | Refills: 1 | Status: SHIPPED | OUTPATIENT
Start: 2025-06-25

## 2025-06-25 ASSESSMENT — ENCOUNTER SYMPTOMS
GASTROINTESTINAL NEGATIVE: 1
NERVOUS/ANXIOUS: 1
PALPITATIONS: 1
APPETITE CHANGE: 0
SLEEP DISTURBANCE: 0
MUSCULOSKELETAL NEGATIVE: 1

## 2025-06-25 NOTE — PROGRESS NOTES
"Subjective   Patient ID: Nelda Greenfield is a 35 y.o. female who presents for Annual Exam (Yearly physical ) and Follow-up (1 month follow up. /Pt had venous duplex completed 5/30/25. /Lov 6/3/25/Labs 6/4/25).    HPI   Patient here for follow up ADHD.  Last office visit on 06/03/2025  Current concern:  1) ADHD- thus far like the 10 mg Adderall bid, seems to calm her anxiety with being able to focus.   Chronic concerns: ADHD, Anxiety, asthma,  IBS, GERD,  Peripheral neuropathy d/t chemotherapy, vitamin D deficiency, Seasonal Allergies, abdominal cramping, Pruritius  Specialist  - Pulmonology   - vascular   - Gastroenterologist Dr Montgomery.   - Allergist Dr Pham  - GYN- yearly  Labs 06/04/2025   SMOKER- 1/2 PPD- since approximately age 16 yo. - considering quitting, would like to try Chantix or patch to aid in cessation.  Caffeine- not much.      Review of Systems   Constitutional:  Negative for appetite change.   Cardiovascular:  Positive for palpitations (baseline).   Gastrointestinal: Negative.    Musculoskeletal: Negative.    Psychiatric/Behavioral:  Negative for sleep disturbance. The patient is nervous/anxious (more calm).      Objective   /78 (BP Location: Left arm, Patient Position: Sitting, BP Cuff Size: Adult)   Pulse 71   Ht 1.778 m (5' 10\")   Wt 78.4 kg (172 lb 12.8 oz)   LMP 05/20/2025   SpO2 99%   BMI 24.79 kg/m²   Weight last appt 173.6 lbs    Stimulants:   What is the patient's goal of therapy? Improve focus/decrease anxiety associated with focus   Is this being achieved with current treatment? YES    Activities of Daily Living:   Is your overall impression that this patient is benefiting (symptom reduction outweighs side effects) from stimulant therapy? Yes     1. Physical Functioning: Better  2. Family Relationship: Same  3. Social Relationship: Same  4. Mood: Better  5. Sleep Patterns: Same  6. Overall Function: Better    I have personally reviewed the OARRs Report. It is part of the " "electronic medical record. I have considered the risk, abuse, dependence, addiction and diversion. I believe that it is clinically appropriate to prescribe this medication.    Last refill:  - Adderall 10 mg 30 days 06/03/2025->07/02/2025  Refilled today noted \"on or after 06/28/2025\"  UDS 06/03/2025  CSA  06/03/2025         Physical Exam  Vitals reviewed.   Cardiovascular:      Rate and Rhythm: Normal rate and regular rhythm.      Heart sounds: Normal heart sounds.   Pulmonary:      Effort: Pulmonary effort is normal.      Breath sounds: Normal breath sounds.   Musculoskeletal:      Cervical back: Neck supple.   Skin:     General: Skin is warm.   Neurological:      General: No focal deficit present.      Mental Status: She is alert.   Psychiatric:         Mood and Affect: Mood normal.         Behavior: Behavior normal.         Judgment: Judgment normal.       Assessment/Plan   Labs - 06/04/2025 routine   Influenza- 10/08/2024   Prevnar 13/20- Not indicated   Shingrix- Not indicated   Colonoscopy- 09/09/2022  EGD 05/28/2025   Cervical CA screen - GYN  Mammogram not indicated  Holter Event Monitor 10/29/2024-10/31/2024- not concerning arhythmia, predominant rhythm with sinus.    EKG (ER on 09/30/2024) NSR.  CT abdomen 05/30/2025- unremarkable with exception of nonobstructive renal calculi each kidney   Diagnoses and all orders for this visit:  ADHD (attention deficit hyperactivity disorder), combined type  - refilled  amphetamine-dextroamphetamine (AdderalL) 10 mg tablet; Take 1 tablet (10 mg) by mouth 2 times a day.  Ulcer of esophagus without bleeding  - refilled  pantoprazole (ProtoNix) 40 mg EC tablet; Take 1 tablet (40 mg) by mouth once daily in the morning. Take before meals. DO NOT CRUSH CHEW OR SPLIT  Panic attack  - refilled short term supply with travel this summer:   LORazepam (Ativan) 0.5 mg tablet; Take 1 tablet (0.5 mg) by mouth once daily as needed for anxiety for up to 5 days.  Peripheral neuropathy " due to chemotherapy (Multi)  - refilled  gabapentin (Neurontin) 300 mg capsule; Take 1 capsule (300 mg) by mouth 3 times a day.    Smoking trying to quit  Discussed options to quit, patches, Wellbutrin, patient is interested in Chantix, Patient is going to work on strategies to help her quit, behavior methods to use and if needing further treatment will contact office     PLAN: Follow up 6 months

## 2025-07-02 ENCOUNTER — APPOINTMENT (OUTPATIENT)
Dept: PRIMARY CARE | Facility: CLINIC | Age: 35
End: 2025-07-02
Payer: COMMERCIAL

## 2025-07-21 ENCOUNTER — APPOINTMENT (OUTPATIENT)
Dept: PULMONOLOGY | Facility: CLINIC | Age: 35
End: 2025-07-21
Payer: COMMERCIAL

## 2025-07-22 ENCOUNTER — OFFICE VISIT (OUTPATIENT)
Dept: VASCULAR SURGERY | Facility: HOSPITAL | Age: 35
End: 2025-07-22
Payer: COMMERCIAL

## 2025-07-22 VITALS
SYSTOLIC BLOOD PRESSURE: 138 MMHG | BODY MASS INDEX: 24.68 KG/M2 | HEART RATE: 83 BPM | WEIGHT: 172 LBS | DIASTOLIC BLOOD PRESSURE: 89 MMHG

## 2025-07-22 DIAGNOSIS — R29.898 HEAVY SENSATION OF LOWER EXTREMITY: ICD-10-CM

## 2025-07-22 DIAGNOSIS — I87.2 VENOUS INSUFFICIENCY: Primary | ICD-10-CM

## 2025-07-22 DIAGNOSIS — M79.601 PAIN IN BOTH UPPER EXTREMITIES: ICD-10-CM

## 2025-07-22 DIAGNOSIS — M79.602 PAIN IN BOTH UPPER EXTREMITIES: ICD-10-CM

## 2025-07-22 PROCEDURE — 99203 OFFICE O/P NEW LOW 30 MIN: CPT | Performed by: SURGERY

## 2025-07-22 PROCEDURE — 99213 OFFICE O/P EST LOW 20 MIN: CPT | Performed by: SURGERY

## 2025-07-22 NOTE — PROGRESS NOTES
Vascular Surgery Clinic Note    CC: BL LE venous insufficiency    HPI:  Nelda Greenfield is 35 y.o. female with history of BL LE venous insufficiency last seen in vascular surgery clinic on 4/23/25. She had undergone VI studies previously significant for deep venous reflux. In the interim she did undergo a CT abd/pel which shows mild compression of the left CIV. No previous history of vascular interventions or vein procedures. She does not have prominent varicose veins. She is compliant with thigh high compression stockings. No ulcerations.    She also reports dizziness and numbness/paresthesias of bilateral upper extremities with positional changes. Occurs with arm exertion and with changing positions supine to standing.    Past Vascular History:      Past Vascular Testing:  VI (5/30/25) - Right CFV and left GFV reflux    Medical History:  Problem List[1]     Meds:   Medications Ordered Prior to Encounter[2]     Allergies:   RX Allergies[3]    SH:    Social Drivers of Health     Tobacco Use: High Risk (7/22/2025)    Patient History     Smoking Tobacco Use: Every Day     Smokeless Tobacco Use: Never     Passive Exposure: Not on file   Alcohol Use: Not on file   Financial Resource Strain: Not on file   Food Insecurity: Not on file   Transportation Needs: Not on file   Physical Activity: Not on file   Stress: Not on file   Social Connections: Not on file   Intimate Partner Violence: Not on file   Depression: Not on file   Housing Stability: Not on file   Utilities: Not on file   Digital Equity: Not on file   Health Literacy: Not on file        FH:  Family History[4]     ROS:  All systems were reviewed and are negative except as per HPI.    Objective:  Vitals:  Vitals:    07/22/25 0939   BP: 138/89   Pulse: 83        Exam:  Constitutional: normal, well appearing  HEENT: normocephalic  CV: regular rate  RESP: symmetric expansion, unlabored breathing  GI: soft, nontender, nondistended  MSK: normal ROM  INT: no  lesions  PSYCH: appropriate mood  NEURO: no deficits  VASC: BL LE mild edema, no prominent varicose veins. Palpable radial arteries bilaterally, diminished pulses with arm raise. EAST negative    Assessment & Plan:  Nelda Greenfield is 35 y.o. female with BL LE deep venous reflux, left leg more symptomatic    - Referral to Dr. Ellsworth or Columba Leal for vein management  - UE US PVR to rule out TOS  - Tilt table testing       Meds      Screening/Surveillance      Next Followup      Carolin Mckeon MD               [1]   Patient Active Problem List  Diagnosis    ADHD (attention deficit hyperactivity disorder), combined type    Anxiety    Dermatographic urticaria    Dermatographism    Fatigue    Gastroesophageal reflux disease with esophagitis    Hodgkin's disease in remission    Hodgkin's disease, nodular sclerosis (Multi)    Irritable bowel syndrome with diarrhea    Neck pain    Neck swelling    Neuropathy due to chemotherapeutic drug (Multi)    Nicotine dependence    Pain of right scapula    Onychomycosis of right great toe    Rash    Seasonal allergies    Pruritus    Shakiness    Tremor    Spontaneous vaginal delivery ([facility])    Swelling of eyelid    Vascular insufficiency    Vitamin D deficiency    Allergic rhinitis    Asthma   [2]   Current Outpatient Medications on File Prior to Visit   Medication Sig Dispense Refill    albuterol-budesonide (Airsupra) 90-80 mcg/actuation inhaler Inhale 2 puffs every 6 hours if needed (shortness of breath, cough, wheezing). 10.7 g 11    amphetamine-dextroamphetamine (AdderalL) 10 mg tablet Take 1 tablet (10 mg) by mouth 2 times a day. 60 tablet 0    cyclobenzaprine (Flexeril) 10 mg tablet TAKE 1 TABLET (10 MG) BY MOUTH AS NEEDED AT BEDTIME FOR MUSCLE SPASMS. 30 tablet 0    fluticasone furoate-vilanteroL (Breo Ellipta) 100-25 mcg/dose inhaler Inhale 1 puff once daily. 60 each 5    gabapentin (Neurontin) 300 mg capsule Take 1 capsule (300 mg) by mouth 3 times a day. 90 capsule 1     levocetirizine (Xyzal) 5 mg tablet TAKE 1 TABLET (5 MG) BY MOUTH DAILY IN THE EVENING 30 tablet 0    pantoprazole (ProtoNix) 40 mg EC tablet Take 1 tablet (40 mg) by mouth once daily in the morning. Take before meals. DO NOT CRUSH CHEW OR SPLIT 90 tablet 1    LORazepam (Ativan) 0.5 mg tablet Take 1 tablet (0.5 mg) by mouth once daily as needed for anxiety for up to 5 days. 5 tablet 0     No current facility-administered medications on file prior to visit.   [3]   Allergies  Allergen Reactions    Codeine Other, Hives, Rash and Swelling   [4]   Family History  Problem Relation Name Age of Onset    Leukemia Maternal Grandmother      Cancer Maternal Grandfather Tigist     Breast cancer Paternal Grandmother Umm

## 2025-07-29 DIAGNOSIS — I95.1 ORTHOSTATIC HYPOTENSION: Primary | ICD-10-CM

## 2025-08-05 ENCOUNTER — HOSPITAL ENCOUNTER (OUTPATIENT)
Dept: VASCULAR MEDICINE | Facility: HOSPITAL | Age: 35
Discharge: HOME | End: 2025-08-05
Payer: COMMERCIAL

## 2025-08-05 DIAGNOSIS — M79.602 PAIN IN BOTH UPPER EXTREMITIES: ICD-10-CM

## 2025-08-05 DIAGNOSIS — M79.601 PAIN IN BOTH UPPER EXTREMITIES: ICD-10-CM

## 2025-08-05 PROCEDURE — 93930 UPPER EXTREMITY STUDY: CPT | Performed by: SURGERY

## 2025-08-05 PROCEDURE — 93922 UPR/L XTREMITY ART 2 LEVELS: CPT | Performed by: SURGERY

## 2025-08-05 PROCEDURE — 93922 UPR/L XTREMITY ART 2 LEVELS: CPT

## 2025-08-06 ENCOUNTER — APPOINTMENT (OUTPATIENT)
Dept: VASCULAR MEDICINE | Facility: HOSPITAL | Age: 35
End: 2025-08-06
Payer: COMMERCIAL

## 2025-08-07 ENCOUNTER — EVALUATION (OUTPATIENT)
Dept: OCCUPATIONAL THERAPY | Facility: HOSPITAL | Age: 35
End: 2025-08-07
Payer: COMMERCIAL

## 2025-08-07 DIAGNOSIS — I89.0 LYMPHEDEMA: Primary | ICD-10-CM

## 2025-08-07 PROCEDURE — 97535 SELF CARE MNGMENT TRAINING: CPT | Mod: GO | Performed by: OCCUPATIONAL THERAPIST

## 2025-08-07 PROCEDURE — 97165 OT EVAL LOW COMPLEX 30 MIN: CPT | Mod: GO | Performed by: OCCUPATIONAL THERAPIST

## 2025-08-07 ASSESSMENT — ACTIVITIES OF DAILY LIVING (ADL): HOME_MANAGEMENT_TIME_ENTRY: 20

## 2025-08-12 ENCOUNTER — TELEPHONE (OUTPATIENT)
Dept: CARDIOLOGY | Facility: HOSPITAL | Age: 35
End: 2025-08-12
Payer: COMMERCIAL

## 2025-08-13 ENCOUNTER — TREATMENT (OUTPATIENT)
Dept: OCCUPATIONAL THERAPY | Facility: HOSPITAL | Age: 35
End: 2025-08-13
Payer: COMMERCIAL

## 2025-08-13 ENCOUNTER — HOSPITAL ENCOUNTER (OUTPATIENT)
Dept: CARDIOLOGY | Facility: HOSPITAL | Age: 35
Discharge: HOME | End: 2025-08-13
Payer: COMMERCIAL

## 2025-08-13 DIAGNOSIS — I89.0 LYMPHEDEMA: Primary | ICD-10-CM

## 2025-08-13 DIAGNOSIS — I95.1 ORTHOSTATIC HYPOTENSION: ICD-10-CM

## 2025-08-13 PROCEDURE — 97535 SELF CARE MNGMENT TRAINING: CPT | Mod: GO | Performed by: OCCUPATIONAL THERAPIST

## 2025-08-13 PROCEDURE — 93660 TILT TABLE EVALUATION: CPT

## 2025-08-13 PROCEDURE — 97140 MANUAL THERAPY 1/> REGIONS: CPT | Mod: GO | Performed by: OCCUPATIONAL THERAPIST

## 2025-08-13 ASSESSMENT — ACTIVITIES OF DAILY LIVING (ADL): HOME_MANAGEMENT_TIME_ENTRY: 30

## 2025-08-18 ENCOUNTER — APPOINTMENT (OUTPATIENT)
Dept: OCCUPATIONAL THERAPY | Facility: HOSPITAL | Age: 35
End: 2025-08-18
Payer: COMMERCIAL

## 2025-08-18 DIAGNOSIS — I89.0 LYMPHEDEMA: Primary | ICD-10-CM

## 2025-08-21 ENCOUNTER — OFFICE VISIT (OUTPATIENT)
Dept: VASCULAR SURGERY | Facility: CLINIC | Age: 35
End: 2025-08-21
Payer: COMMERCIAL

## 2025-08-21 VITALS
HEIGHT: 70 IN | HEART RATE: 67 BPM | SYSTOLIC BLOOD PRESSURE: 132 MMHG | OXYGEN SATURATION: 100 % | DIASTOLIC BLOOD PRESSURE: 85 MMHG | BODY MASS INDEX: 23.77 KG/M2 | WEIGHT: 166 LBS

## 2025-08-21 DIAGNOSIS — I87.1 MAY-THURNER SYNDROME: Primary | ICD-10-CM

## 2025-08-21 DIAGNOSIS — I87.2 VENOUS INSUFFICIENCY: ICD-10-CM

## 2025-08-21 PROCEDURE — 99213 OFFICE O/P EST LOW 20 MIN: CPT | Performed by: STUDENT IN AN ORGANIZED HEALTH CARE EDUCATION/TRAINING PROGRAM

## 2025-08-21 PROCEDURE — 3008F BODY MASS INDEX DOCD: CPT | Performed by: STUDENT IN AN ORGANIZED HEALTH CARE EDUCATION/TRAINING PROGRAM

## 2025-08-21 ASSESSMENT — ENCOUNTER SYMPTOMS
DEPRESSION: 0
OCCASIONAL FEELINGS OF UNSTEADINESS: 0
LOSS OF SENSATION IN FEET: 1

## 2025-08-21 ASSESSMENT — PAIN SCALES - GENERAL: PAINLEVEL_OUTOF10: 0-NO PAIN

## 2025-08-25 ENCOUNTER — TREATMENT (OUTPATIENT)
Dept: OCCUPATIONAL THERAPY | Facility: HOSPITAL | Age: 35
End: 2025-08-25
Payer: COMMERCIAL

## 2025-08-25 DIAGNOSIS — I89.0 LYMPHEDEMA: Primary | ICD-10-CM

## 2025-08-25 PROCEDURE — 97140 MANUAL THERAPY 1/> REGIONS: CPT | Mod: GO,CO

## 2025-08-25 PROCEDURE — 97110 THERAPEUTIC EXERCISES: CPT | Mod: GO,CO

## 2025-08-25 PROCEDURE — 97535 SELF CARE MNGMENT TRAINING: CPT | Mod: GO,CO

## 2025-08-25 ASSESSMENT — ACTIVITIES OF DAILY LIVING (ADL): HOME_MANAGEMENT_TIME_ENTRY: 15

## 2025-08-25 ASSESSMENT — PAIN - FUNCTIONAL ASSESSMENT: PAIN_FUNCTIONAL_ASSESSMENT: 0-10

## 2025-08-25 ASSESSMENT — PAIN SCALES - GENERAL: PAINLEVEL_OUTOF10: 2

## 2025-09-02 ENCOUNTER — TREATMENT (OUTPATIENT)
Dept: OCCUPATIONAL THERAPY | Facility: HOSPITAL | Age: 35
End: 2025-09-02
Payer: COMMERCIAL

## 2025-09-02 DIAGNOSIS — I89.0 LYMPHEDEMA: Primary | ICD-10-CM

## 2025-09-02 PROCEDURE — 97110 THERAPEUTIC EXERCISES: CPT | Mod: GO,CO

## 2025-09-02 PROCEDURE — 97140 MANUAL THERAPY 1/> REGIONS: CPT | Mod: GO,CO

## 2025-09-02 ASSESSMENT — ACTIVITIES OF DAILY LIVING (ADL): HOME_MANAGEMENT_TIME_ENTRY: 10

## 2025-09-02 ASSESSMENT — PAIN SCALES - GENERAL: PAINLEVEL_OUTOF10: 1

## 2025-09-02 ASSESSMENT — PAIN - FUNCTIONAL ASSESSMENT: PAIN_FUNCTIONAL_ASSESSMENT: 0-10

## 2025-12-15 ENCOUNTER — APPOINTMENT (OUTPATIENT)
Dept: PRIMARY CARE | Facility: CLINIC | Age: 35
End: 2025-12-15
Payer: COMMERCIAL